# Patient Record
Sex: MALE | Race: WHITE | NOT HISPANIC OR LATINO | Employment: OTHER | URBAN - METROPOLITAN AREA
[De-identification: names, ages, dates, MRNs, and addresses within clinical notes are randomized per-mention and may not be internally consistent; named-entity substitution may affect disease eponyms.]

---

## 2017-07-13 ENCOUNTER — HISTORICAL (OUTPATIENT)
Dept: ADMINISTRATIVE | Facility: HOSPITAL | Age: 72
End: 2017-07-13

## 2017-08-11 ENCOUNTER — HISTORICAL (OUTPATIENT)
Dept: ADMINISTRATIVE | Facility: HOSPITAL | Age: 72
End: 2017-08-11

## 2017-08-22 ENCOUNTER — HISTORICAL (OUTPATIENT)
Dept: LAB | Facility: HOSPITAL | Age: 72
End: 2017-08-22

## 2017-08-22 LAB
ALBUMIN SERPL-MCNC: 3.9 GM/DL (ref 3.4–5)
ALP SERPL-CCNC: 97 UNIT/L (ref 46–116)
ALT SERPL-CCNC: 34 UNIT/L (ref 12–78)
AST SERPL-CCNC: 22 UNIT/L (ref 15–37)
BILIRUB SERPL-MCNC: 0.6 MG/DL (ref 0.2–1)
BILIRUBIN DIRECT+TOT PNL SERPL-MCNC: 0.17 MG/DL (ref 0–0.2)
BILIRUBIN DIRECT+TOT PNL SERPL-MCNC: 0.43 MG/DL (ref 0–0.8)
BUN SERPL-MCNC: 18 MG/DL (ref 7–18)
CALCIUM SERPL-MCNC: 9 MG/DL (ref 8.5–10.1)
CHLORIDE SERPL-SCNC: 105 MMOL/L (ref 98–107)
CHOLEST SERPL-MCNC: 111 MG/DL (ref 0–200)
CHOLEST/HDLC SERPL: 3.7 {RATIO} (ref 0–5)
CO2 SERPL-SCNC: 27.2 MMOL/L (ref 21–32)
CREAT SERPL-MCNC: 0.96 MG/DL (ref 0.6–1.3)
ERYTHROCYTE [DISTWIDTH] IN BLOOD BY AUTOMATED COUNT: 12.8 % (ref 11.5–17)
EST. AVERAGE GLUCOSE BLD GHB EST-MCNC: 114 MG/DL
FT4I SERPL CALC-MCNC: 2.38
GLUCOSE SERPL-MCNC: 107 MG/DL (ref 74–106)
HBA1C MFR BLD: 5.6 % (ref 4.5–6.2)
HCT VFR BLD AUTO: 46 % (ref 42–52)
HDLC SERPL-MCNC: 30 MG/DL (ref 40–60)
HGB BLD-MCNC: 15.2 GM/DL (ref 14–18)
LDLC SERPL CALC-MCNC: 52 MG/DL (ref 0–129)
MCH RBC QN AUTO: 31.9 PG (ref 27–31)
MCHC RBC AUTO-ENTMCNC: 33.2 GM/DL (ref 33–36)
MCV RBC AUTO: 96.2 FL (ref 80–94)
PLATELET # BLD AUTO: 151 X10(3)/MCL (ref 130–400)
PMV BLD AUTO: 8.6 FL (ref 7.4–10.4)
POTASSIUM SERPL-SCNC: 4 MMOL/L (ref 3.5–5.1)
PROT SERPL-MCNC: 7 GM/DL (ref 6.4–8.2)
PSA SERPL-MCNC: 0.71 NG/ML (ref 0–4)
RBC # BLD AUTO: 4.78 X10(6)/MCL (ref 4.7–6.1)
SODIUM SERPL-SCNC: 142 MMOL/L (ref 136–145)
T3RU NFR SERPL: 33 % (ref 31–39)
T4 SERPL-MCNC: 7.2 MCG/DL (ref 4.7–13.3)
TRIGL SERPL-MCNC: 146 MG/DL
TSH SERPL-ACNC: 2.34 MIU/ML (ref 0.36–3.74)
VLDLC SERPL CALC-MCNC: 29 MG/DL
WBC # SPEC AUTO: 7.1 X10(3)/MCL (ref 4.5–11.5)

## 2018-09-05 ENCOUNTER — HISTORICAL (OUTPATIENT)
Dept: LAB | Facility: HOSPITAL | Age: 73
End: 2018-09-05

## 2018-09-05 LAB
ABS NEUT (OLG): 3.78 X10(3)/MCL (ref 2.1–9.2)
ALBUMIN SERPL-MCNC: 3.9 GM/DL (ref 3.4–5)
ALP SERPL-CCNC: 84 UNIT/L (ref 46–116)
ALT SERPL-CCNC: 45 UNIT/L (ref 12–78)
AST SERPL-CCNC: 28 UNIT/L (ref 15–37)
BASOPHILS # BLD AUTO: 0 X10(3)/MCL (ref 0–0.2)
BASOPHILS NFR BLD AUTO: 0 %
BILIRUB SERPL-MCNC: 0.6 MG/DL (ref 0.2–1)
BILIRUBIN DIRECT+TOT PNL SERPL-MCNC: 0.18 MG/DL (ref 0–0.2)
BILIRUBIN DIRECT+TOT PNL SERPL-MCNC: 0.42 MG/DL (ref 0–0.8)
BUN SERPL-MCNC: 10 MG/DL (ref 7–18)
CALCIUM SERPL-MCNC: 8.8 MG/DL (ref 8.5–10.1)
CHLORIDE SERPL-SCNC: 106 MMOL/L (ref 98–107)
CHOLEST SERPL-MCNC: 90 MG/DL (ref 0–200)
CHOLEST/HDLC SERPL: 3 {RATIO} (ref 0–5)
CO2 SERPL-SCNC: 31.7 MMOL/L (ref 21–32)
CREAT SERPL-MCNC: 1.09 MG/DL (ref 0.6–1.3)
CREAT/UREA NIT SERPL: 9
EOSINOPHIL # BLD AUTO: 0.5 X10(3)/MCL (ref 0–0.9)
EOSINOPHIL NFR BLD AUTO: 7 %
ERYTHROCYTE [DISTWIDTH] IN BLOOD BY AUTOMATED COUNT: 11.8 % (ref 11.5–17)
FT4I SERPL CALC-MCNC: 1.98
GLUCOSE SERPL-MCNC: 108 MG/DL (ref 74–106)
HCT VFR BLD AUTO: 47.5 % (ref 42–52)
HDLC SERPL-MCNC: 30 MG/DL (ref 40–60)
HGB BLD-MCNC: 16 GM/DL (ref 14–18)
LDLC SERPL CALC-MCNC: 21 MG/DL (ref 0–129)
LYMPHOCYTES # BLD AUTO: 2.2 X10(3)/MCL (ref 0.6–4.6)
LYMPHOCYTES NFR BLD AUTO: 31 %
MCH RBC QN AUTO: 31.9 PG (ref 27–31)
MCHC RBC AUTO-ENTMCNC: 33.7 GM/DL (ref 33–36)
MCV RBC AUTO: 94.8 FL (ref 80–94)
MONOCYTES # BLD AUTO: 0.7 X10(3)/MCL (ref 0.1–1.3)
MONOCYTES NFR BLD AUTO: 9 %
NEUTROPHILS # BLD AUTO: 3.78 X10(3)/MCL (ref 1.4–7.9)
NEUTROPHILS NFR BLD AUTO: 52 %
PLATELET # BLD AUTO: 154 X10(3)/MCL (ref 130–400)
PMV BLD AUTO: 10.6 FL (ref 9.4–12.4)
POTASSIUM SERPL-SCNC: 5 MMOL/L (ref 3.5–5.1)
PROT SERPL-MCNC: 7 GM/DL (ref 6.4–8.2)
PSA SERPL-MCNC: 0.75 NG/ML (ref 0–4)
RBC # BLD AUTO: 5.01 X10(6)/MCL (ref 4.7–6.1)
SODIUM SERPL-SCNC: 143 MMOL/L (ref 136–145)
T3RU NFR SERPL: 30 % (ref 31–39)
T4 SERPL-MCNC: 6.6 MCG/DL (ref 4.7–13.3)
TRIGL SERPL-MCNC: 196 MG/DL
TSH SERPL-ACNC: 2.3 MIU/ML (ref 0.36–3.74)
VLDLC SERPL CALC-MCNC: 39 MG/DL
WBC # SPEC AUTO: 7.2 X10(3)/MCL (ref 4.5–11.5)

## 2019-04-19 ENCOUNTER — HISTORICAL (OUTPATIENT)
Dept: LAB | Facility: HOSPITAL | Age: 74
End: 2019-04-19

## 2019-04-19 LAB
ALBUMIN SERPL-MCNC: 4 GM/DL (ref 3.4–5)
ALP SERPL-CCNC: 99 UNIT/L (ref 46–116)
ALT SERPL-CCNC: 36 UNIT/L (ref 12–78)
AST SERPL-CCNC: 26 UNIT/L (ref 15–37)
BILIRUB SERPL-MCNC: 0.7 MG/DL (ref 0.2–1)
BILIRUBIN DIRECT+TOT PNL SERPL-MCNC: 0.19 MG/DL (ref 0–0.2)
BILIRUBIN DIRECT+TOT PNL SERPL-MCNC: 0.51 MG/DL (ref 0–0.8)
BUN SERPL-MCNC: 10.6 MG/DL (ref 7–18)
CALCIUM SERPL-MCNC: 9.2 MG/DL (ref 8.5–10.1)
CHLORIDE SERPL-SCNC: 104 MMOL/L (ref 98–107)
CHOLEST SERPL-MCNC: 96 MG/DL (ref 0–200)
CHOLEST/HDLC SERPL: 3.4 {RATIO} (ref 0–5)
CO2 SERPL-SCNC: 28.6 MMOL/L (ref 21–32)
CREAT SERPL-MCNC: 0.95 MG/DL (ref 0.6–1.3)
CREAT/UREA NIT SERPL: 11
ERYTHROCYTE [DISTWIDTH] IN BLOOD BY AUTOMATED COUNT: 12 % (ref 11.5–17)
FT4I SERPL CALC-MCNC: 1.91
GLUCOSE SERPL-MCNC: 119 MG/DL (ref 74–106)
HCT VFR BLD AUTO: 45.2 % (ref 42–52)
HDLC SERPL-MCNC: 28 MG/DL (ref 40–60)
HGB BLD-MCNC: 15.8 GM/DL (ref 14–18)
LDLC SERPL CALC-MCNC: 46 MG/DL (ref 0–129)
MCH RBC QN AUTO: 32.4 PG (ref 27–31)
MCHC RBC AUTO-ENTMCNC: 35 GM/DL (ref 33–36)
MCV RBC AUTO: 92.6 FL (ref 80–94)
PLATELET # BLD AUTO: 138 X10(3)/MCL (ref 130–400)
PMV BLD AUTO: 10.2 FL (ref 9.4–12.4)
POTASSIUM SERPL-SCNC: 4.1 MMOL/L (ref 3.5–5.1)
PROT SERPL-MCNC: 7.3 GM/DL (ref 6.4–8.2)
RBC # BLD AUTO: 4.88 X10(6)/MCL (ref 4.7–6.1)
SODIUM SERPL-SCNC: 141 MMOL/L (ref 136–145)
T3RU NFR SERPL: 29 % (ref 31–39)
T4 SERPL-MCNC: 6.6 MCG/DL (ref 4.7–13.3)
TRIGL SERPL-MCNC: 110 MG/DL
TSH SERPL-ACNC: 1.87 MIU/ML (ref 0.36–3.74)
VLDLC SERPL CALC-MCNC: 22 MG/DL
WBC # SPEC AUTO: 6.4 X10(3)/MCL (ref 4.5–11.5)

## 2019-04-24 LAB
EST. AVERAGE GLUCOSE BLD GHB EST-MCNC: 126 MG/DL
HBA1C MFR BLD: 6 % (ref 4.5–6.2)

## 2019-07-18 ENCOUNTER — HISTORICAL (OUTPATIENT)
Dept: RADIOLOGY | Facility: HOSPITAL | Age: 74
End: 2019-07-18

## 2019-10-02 ENCOUNTER — HISTORICAL (OUTPATIENT)
Dept: LAB | Facility: HOSPITAL | Age: 74
End: 2019-10-02

## 2019-10-02 LAB
ABS NEUT (OLG): 4.27 X10(3)/MCL (ref 2.1–9.2)
ALBUMIN SERPL-MCNC: 4 GM/DL (ref 3.4–5)
ALP SERPL-CCNC: 90 UNIT/L (ref 46–116)
ALT SERPL-CCNC: 40 UNIT/L (ref 12–78)
AST SERPL-CCNC: 30 UNIT/L (ref 15–37)
BASOPHILS # BLD AUTO: 0 X10(3)/MCL (ref 0–0.2)
BASOPHILS NFR BLD AUTO: 0 %
BILIRUB SERPL-MCNC: 0.7 MG/DL (ref 0.2–1)
BILIRUBIN DIRECT+TOT PNL SERPL-MCNC: 0.18 MG/DL (ref 0–0.2)
BILIRUBIN DIRECT+TOT PNL SERPL-MCNC: 0.52 MG/DL (ref 0–0.8)
BUN SERPL-MCNC: 17.7 MG/DL (ref 7–18)
CALCIUM SERPL-MCNC: 9.1 MG/DL (ref 8.5–10.1)
CHLORIDE SERPL-SCNC: 105 MMOL/L (ref 98–107)
CHOLEST SERPL-MCNC: 85 MG/DL (ref 0–200)
CHOLEST/HDLC SERPL: 2.9 {RATIO} (ref 0–5)
CO2 SERPL-SCNC: 29.6 MMOL/L (ref 21–32)
CREAT SERPL-MCNC: 0.9 MG/DL (ref 0.6–1.3)
CREAT/UREA NIT SERPL: 20
EOSINOPHIL # BLD AUTO: 0.5 X10(3)/MCL (ref 0–0.9)
EOSINOPHIL NFR BLD AUTO: 6 %
ERYTHROCYTE [DISTWIDTH] IN BLOOD BY AUTOMATED COUNT: 12.1 % (ref 11.5–17)
EST. AVERAGE GLUCOSE BLD GHB EST-MCNC: 117 MG/DL
FT4I SERPL CALC-MCNC: 2.18
GLUCOSE SERPL-MCNC: 110 MG/DL (ref 74–106)
HBA1C MFR BLD: 5.7 % (ref 4.5–6.2)
HCT VFR BLD AUTO: 47.8 % (ref 42–52)
HDLC SERPL-MCNC: 29 MG/DL (ref 40–60)
HGB BLD-MCNC: 15.5 GM/DL (ref 14–18)
IMM GRANULOCYTES # BLD AUTO: 0.01 % (ref 0–0.02)
IMM GRANULOCYTES NFR BLD AUTO: 0.1 % (ref 0–0.43)
LDLC SERPL CALC-MCNC: 41 MG/DL (ref 0–129)
LYMPHOCYTES # BLD AUTO: 2.3 X10(3)/MCL (ref 0.6–4.6)
LYMPHOCYTES NFR BLD AUTO: 30 %
MCH RBC QN AUTO: 31.8 PG (ref 27–31)
MCHC RBC AUTO-ENTMCNC: 32.4 GM/DL (ref 33–36)
MCV RBC AUTO: 98 FL (ref 80–94)
MONOCYTES # BLD AUTO: 0.6 X10(3)/MCL (ref 0.1–1.3)
MONOCYTES NFR BLD AUTO: 8 %
NEUTROPHILS # BLD AUTO: 4.27 X10(3)/MCL (ref 1.4–7.9)
NEUTROPHILS NFR BLD AUTO: 55 %
PLATELET # BLD AUTO: 128 X10(3)/MCL (ref 130–400)
PMV BLD AUTO: 10.5 FL (ref 9.4–12.4)
POTASSIUM SERPL-SCNC: 4.1 MMOL/L (ref 3.5–5.1)
PROT SERPL-MCNC: 7.2 GM/DL (ref 6.4–8.2)
PSA SERPL-MCNC: 2.1 NG/ML (ref 0–4)
RBC # BLD AUTO: 4.88 X10(6)/MCL (ref 4.7–6.1)
SODIUM SERPL-SCNC: 140 MMOL/L (ref 136–145)
T3RU NFR SERPL: 34 % (ref 31–39)
T4 SERPL-MCNC: 6.4 MCG/DL (ref 4.7–13.3)
TRIGL SERPL-MCNC: 77 MG/DL
TSH SERPL-ACNC: 2.76 MIU/ML (ref 0.36–3.74)
VLDLC SERPL CALC-MCNC: 15 MG/DL
WBC # SPEC AUTO: 7.7 X10(3)/MCL (ref 4.5–11.5)

## 2020-10-20 ENCOUNTER — HISTORICAL (OUTPATIENT)
Dept: LAB | Facility: HOSPITAL | Age: 75
End: 2020-10-20

## 2020-10-20 LAB
ABS NEUT (OLG): 3.91 X10(3)/MCL (ref 2.1–9.2)
ALBUMIN SERPL-MCNC: 4.08 GM/DL (ref 3.4–5)
ALP SERPL-CCNC: 89 UNIT/L (ref 46–116)
ALT SERPL-CCNC: 39 UNIT/L (ref 12–78)
AST SERPL-CCNC: 25 UNIT/L (ref 15–37)
BASOPHILS # BLD AUTO: 0 X10(3)/MCL (ref 0–0.2)
BASOPHILS NFR BLD AUTO: 0 %
BILIRUB SERPL-MCNC: 0.8 MG/DL (ref 0.2–1)
BILIRUBIN DIRECT+TOT PNL SERPL-MCNC: 0.22 MG/DL (ref 0–0.2)
BILIRUBIN DIRECT+TOT PNL SERPL-MCNC: 0.58 MG/DL (ref 0–0.8)
BUN SERPL-MCNC: 14.2 MG/DL (ref 7–18)
CALCIUM SERPL-MCNC: 9.1 MG/DL (ref 8.5–10.1)
CHLORIDE SERPL-SCNC: 104 MMOL/L (ref 98–107)
CHOLEST SERPL-MCNC: 104 MG/DL (ref 0–200)
CHOLEST/HDLC SERPL: 3.2 {RATIO} (ref 0–5)
CO2 SERPL-SCNC: 28 MMOL/L (ref 21–32)
CREAT SERPL-MCNC: 1.05 MG/DL (ref 0.6–1.3)
CREAT/UREA NIT SERPL: 14 MG/DL (ref 12–14)
EOSINOPHIL # BLD AUTO: 0.4 X10(3)/MCL (ref 0–0.9)
EOSINOPHIL NFR BLD AUTO: 7 %
ERYTHROCYTE [DISTWIDTH] IN BLOOD BY AUTOMATED COUNT: 12.1 % (ref 11.5–17)
EST. AVERAGE GLUCOSE BLD GHB EST-MCNC: 126 MG/DL
FT4I SERPL CALC-MCNC: 2.2
GLUCOSE SERPL-MCNC: 112 MG/DL (ref 74–106)
HBA1C MFR BLD: 6 % (ref 4.5–6.2)
HCT VFR BLD AUTO: 49.7 % (ref 42–52)
HDLC SERPL-MCNC: 33 MG/DL (ref 40–60)
HGB BLD-MCNC: 16.3 GM/DL (ref 14–18)
IMM GRANULOCYTES # BLD AUTO: 0.01 % (ref 0–0.02)
IMM GRANULOCYTES NFR BLD AUTO: 0.1 % (ref 0–0.43)
LDLC SERPL CALC-MCNC: 46 MG/DL (ref 0–129)
LYMPHOCYTES # BLD AUTO: 1.8 X10(3)/MCL (ref 0.6–4.6)
LYMPHOCYTES NFR BLD AUTO: 27 %
MCH RBC QN AUTO: 32 PG (ref 27–31)
MCHC RBC AUTO-ENTMCNC: 32.8 GM/DL (ref 33–36)
MCV RBC AUTO: 97.5 FL (ref 80–94)
MONOCYTES # BLD AUTO: 0.6 X10(3)/MCL (ref 0.1–1.3)
MONOCYTES NFR BLD AUTO: 9 %
NEUTROPHILS # BLD AUTO: 3.91 X10(3)/MCL (ref 1.4–7.9)
NEUTROPHILS NFR BLD AUTO: 57 %
PLATELET # BLD AUTO: 153 X10(3)/MCL (ref 130–400)
PMV BLD AUTO: 10.3 FL (ref 9.4–12.4)
POTASSIUM SERPL-SCNC: 4.1 MMOL/L (ref 3.5–5.1)
PROT SERPL-MCNC: 7.4 GM/DL (ref 6.4–8.2)
PSA SERPL-MCNC: 3.01 NG/ML (ref 0–4)
RBC # BLD AUTO: 5.1 X10(6)/MCL (ref 4.7–6.1)
SODIUM SERPL-SCNC: 141 MMOL/L (ref 136–145)
T3RU NFR SERPL: 31 % (ref 31–39)
T4 SERPL-MCNC: 7.1 MCG/DL (ref 4.7–13.3)
TRIGL SERPL-MCNC: 124 MG/DL
TSH SERPL-ACNC: 2.94 MIU/ML (ref 0.36–3.74)
VLDLC SERPL CALC-MCNC: 25 MG/DL
WBC # SPEC AUTO: 6.8 X10(3)/MCL (ref 4.5–11.5)

## 2022-04-21 ENCOUNTER — HISTORICAL (OUTPATIENT)
Dept: LAB | Facility: HOSPITAL | Age: 77
End: 2022-04-21

## 2022-04-21 LAB
HEMOLYSIS INTERF INDEX SERPL-ACNC: 8
POTASSIUM SERPL-SCNC: 4.1 MMOL/L (ref 3.5–5.1)

## 2022-04-30 NOTE — OP NOTE
Patient:   Toni Mehta            MRN: [id number]            FIN: [id number]               Age:   71 years     Sex:  Male     :  1945   Associated Diagnoses:   None   Author:   Pepe Phliippe MD      Preoperative Diagnosis: Cataract Right eye    Postoperative Diagnosis: Cataract Right eye    Procedure: Phacoemulsification with intaocular lens implantations Right eye    Surgeon: Pepe Philippe MD    Assistant: Ana Lilia Pleitez [facility]    Anestheisa: Topical    Complications: None    The patient was brought into the operating suite, where the patient was correctly identified as was the operative eye via timeout.  The patient was prepped and draped in a sterile ophthalmic fashion.  A lid speculum was placed in the operative eye and the microscope was brought into place.  A 1.0mm paracentesis was then made at (12) o'clock.  The anterior chamber was filled with Endocoat.  A (temporal) clear corneal incision was made with a 2.4 mm keratome.  A 6 mm corneal marking ring was used to felicita the cornea centered over the visual axis.  A 5.00 mm continuous curvilinear capsulorhexis was fashioned using a cystotome and microcapsular forceps.  Hydrodissection and hydrodelineation was performed with upreserved 1% Xylocaine.  The nucleus was then phacoemulsified with the Abbott phacoemulsification hand-piece with a total of (5) EFX.  The cortex was then removed with the Carlos I/A hand-piece. An BRANDT lens model (ZCB00) with a power of (22.5) was placed in the capsular bag.  The Helon was then removed from the eye with the Carlos I/A hand piece.  The anterior chamber was inflated and the wounds were hydrated with BSS.  The wounds were checked with Weck-Mayi sponges and found to be watertight.  The lid speculum was removed and topical antibiotics were placed on the operative eye.  The patient was brought to PACU in good condition.        Surgery Date 17 \[facility]\""

## 2022-09-19 ENCOUNTER — LAB VISIT (OUTPATIENT)
Dept: LAB | Facility: HOSPITAL | Age: 77
End: 2022-09-19
Attending: FAMILY MEDICINE
Payer: MEDICARE

## 2022-09-19 DIAGNOSIS — E78.5 HYPERLIPIDEMIA, UNSPECIFIED HYPERLIPIDEMIA TYPE: ICD-10-CM

## 2022-09-19 DIAGNOSIS — Z12.5 SPECIAL SCREENING FOR MALIGNANT NEOPLASM OF PROSTATE: ICD-10-CM

## 2022-09-19 DIAGNOSIS — Z00.00 ROUTINE GENERAL MEDICAL EXAMINATION AT A HEALTH CARE FACILITY: Primary | ICD-10-CM

## 2022-09-19 DIAGNOSIS — I10 ESSENTIAL HYPERTENSION, MALIGNANT: ICD-10-CM

## 2022-09-19 DIAGNOSIS — Z79.899 ENCOUNTER FOR LONG-TERM (CURRENT) USE OF OTHER MEDICATIONS: ICD-10-CM

## 2022-09-19 LAB
ALBUMIN SERPL-MCNC: 3.9 GM/DL (ref 3.4–4.8)
ALP SERPL-CCNC: 97 UNIT/L (ref 40–150)
ALT SERPL-CCNC: 22 UNIT/L (ref 0–55)
ANION GAP SERPL CALC-SCNC: 7 MEQ/L
AST SERPL-CCNC: 26 UNIT/L (ref 5–34)
BASOPHILS # BLD AUTO: 0.02 X10(3)/MCL (ref 0–0.2)
BASOPHILS NFR BLD AUTO: 0.3 %
BILIRUBIN DIRECT+TOT PNL SERPL-MCNC: 0.4 MG/DL (ref 0–0.5)
BILIRUBIN DIRECT+TOT PNL SERPL-MCNC: 0.4 MG/DL (ref 0–0.8)
BILIRUBIN DIRECT+TOT PNL SERPL-MCNC: 0.8 MG/DL
BUN SERPL-MCNC: 8.2 MG/DL (ref 8.4–25.7)
CALCIUM SERPL-MCNC: 9.5 MG/DL (ref 8.8–10)
CHLORIDE SERPL-SCNC: 107 MMOL/L (ref 98–107)
CHOLEST SERPL-MCNC: 91 MG/DL
CHOLEST/HDLC SERPL: 3 {RATIO} (ref 0–5)
CO2 SERPL-SCNC: 27 MMOL/L (ref 23–31)
CREAT SERPL-MCNC: 0.8 MG/DL (ref 0.73–1.18)
CREAT/UREA NIT SERPL: 10
EOSINOPHIL # BLD AUTO: 0.41 X10(3)/MCL (ref 0–0.9)
EOSINOPHIL NFR BLD AUTO: 5.7 %
ERYTHROCYTE [DISTWIDTH] IN BLOOD BY AUTOMATED COUNT: 12.1 % (ref 11.5–17)
FT4I SERPL CALC-MCNC: 2.51 (ref 2.6–3.6)
GFR SERPLBLD CREATININE-BSD FMLA CKD-EPI: >60 MLS/MIN/1.73/M2
GLUCOSE SERPL-MCNC: 114 MG/DL (ref 82–115)
HCT VFR BLD AUTO: 50.6 % (ref 42–52)
HDLC SERPL-MCNC: 27 MG/DL (ref 35–60)
HGB BLD-MCNC: 16.4 GM/DL (ref 14–18)
IMM GRANULOCYTES # BLD AUTO: 0.01 X10(3)/MCL (ref 0–0.04)
IMM GRANULOCYTES NFR BLD AUTO: 0.1 %
LDLC SERPL CALC-MCNC: 43 MG/DL (ref 50–140)
LYMPHOCYTES # BLD AUTO: 2.2 X10(3)/MCL (ref 0.6–4.6)
LYMPHOCYTES NFR BLD AUTO: 30.5 %
MCH RBC QN AUTO: 30.8 PG (ref 27–31)
MCHC RBC AUTO-ENTMCNC: 32.4 MG/DL (ref 33–36)
MCV RBC AUTO: 94.9 FL (ref 80–94)
MONOCYTES # BLD AUTO: 0.54 X10(3)/MCL (ref 0.1–1.3)
MONOCYTES NFR BLD AUTO: 7.5 %
NEUTROPHILS # BLD AUTO: 4 X10(3)/MCL (ref 2.1–9.2)
NEUTROPHILS NFR BLD AUTO: 55.9 %
PLATELET # BLD AUTO: 133 X10(3)/MCL (ref 130–400)
PMV BLD AUTO: 10.5 FL (ref 7.4–10.4)
POTASSIUM SERPL-SCNC: 4 MMOL/L (ref 3.5–5.1)
PROT SERPL-MCNC: 7.2 GM/DL (ref 5.8–7.6)
PSA SERPL-MCNC: 2.87 NG/ML
RBC # BLD AUTO: 5.33 X10(6)/MCL (ref 4.7–6.1)
SODIUM SERPL-SCNC: 141 MMOL/L (ref 136–145)
T3RU NFR SERPL: 40.16 % (ref 31–39)
T4 SERPL-MCNC: 6.24 UG/DL (ref 4.87–11.72)
TRIGL SERPL-MCNC: 105 MG/DL (ref 34–140)
TSH SERPL-ACNC: 2.39 UIU/ML (ref 0.35–4.94)
VLDLC SERPL CALC-MCNC: 21 MG/DL
WBC # SPEC AUTO: 7.2 X10(3)/MCL (ref 4.5–11.5)

## 2022-09-19 PROCEDURE — 84153 ASSAY OF PSA TOTAL: CPT

## 2022-09-19 PROCEDURE — 85025 COMPLETE CBC W/AUTO DIFF WBC: CPT

## 2022-09-19 PROCEDURE — 84443 ASSAY THYROID STIM HORMONE: CPT

## 2022-09-19 PROCEDURE — 36415 COLL VENOUS BLD VENIPUNCTURE: CPT

## 2022-09-19 PROCEDURE — 84479 ASSAY OF THYROID (T3 OR T4): CPT

## 2022-09-19 PROCEDURE — 80048 BASIC METABOLIC PNL TOTAL CA: CPT

## 2022-09-19 PROCEDURE — 84436 ASSAY OF TOTAL THYROXINE: CPT

## 2022-09-19 PROCEDURE — 80061 LIPID PANEL: CPT

## 2022-09-19 PROCEDURE — 80076 HEPATIC FUNCTION PANEL: CPT

## 2023-06-29 ENCOUNTER — HOSPITAL ENCOUNTER (OUTPATIENT)
Dept: RADIOLOGY | Facility: HOSPITAL | Age: 78
Discharge: HOME | End: 2023-06-29
Attending: FAMILY MEDICINE
Payer: MEDICARE

## 2023-06-29 DIAGNOSIS — M79.89 SWELLING OF RIGHT LOWER EXTREMITY: ICD-10-CM

## 2023-06-29 PROCEDURE — 93971 EXTREMITY STUDY: CPT | Mod: TC,RT

## 2023-10-19 ENCOUNTER — LAB VISIT (OUTPATIENT)
Dept: LAB | Facility: HOSPITAL | Age: 78
End: 2023-10-19
Attending: INTERNAL MEDICINE
Payer: MEDICARE

## 2023-10-19 DIAGNOSIS — I48.0 PAROXYSMAL ATRIAL FIBRILLATION: Primary | ICD-10-CM

## 2023-10-19 DIAGNOSIS — I10 ESSENTIAL HYPERTENSION, MALIGNANT: ICD-10-CM

## 2023-10-19 DIAGNOSIS — I25.10 CORONARY ATHEROSCLEROSIS OF NATIVE CORONARY ARTERY: ICD-10-CM

## 2023-10-19 DIAGNOSIS — E78.5 HYPERLIPIDEMIA, UNSPECIFIED HYPERLIPIDEMIA TYPE: ICD-10-CM

## 2023-10-19 LAB
ALBUMIN SERPL-MCNC: 3.9 G/DL (ref 3.4–4.8)
ALBUMIN/GLOB SERPL: 1.5 RATIO (ref 1.1–2)
ALP SERPL-CCNC: 108 UNIT/L (ref 40–150)
ALT SERPL-CCNC: 29 UNIT/L (ref 0–55)
AST SERPL-CCNC: 29 UNIT/L (ref 5–34)
BILIRUB SERPL-MCNC: 1 MG/DL
BUN SERPL-MCNC: 12.6 MG/DL (ref 8.4–25.7)
CALCIUM SERPL-MCNC: 9.3 MG/DL (ref 8.8–10)
CHLORIDE SERPL-SCNC: 107 MMOL/L (ref 98–107)
CHOLEST SERPL-MCNC: 83 MG/DL
CHOLEST/HDLC SERPL: 4 {RATIO} (ref 0–5)
CO2 SERPL-SCNC: 29 MMOL/L (ref 23–31)
CREAT SERPL-MCNC: 0.79 MG/DL (ref 0.73–1.18)
GFR SERPLBLD CREATININE-BSD FMLA CKD-EPI: >60 MLS/MIN/1.73/M2
GLOBULIN SER-MCNC: 2.6 GM/DL (ref 2.4–3.5)
GLUCOSE SERPL-MCNC: 105 MG/DL (ref 82–115)
HDLC SERPL-MCNC: 23 MG/DL (ref 35–60)
LDLC SERPL CALC-MCNC: 41 MG/DL (ref 50–140)
POTASSIUM SERPL-SCNC: 4.9 MMOL/L (ref 3.5–5.1)
PROT SERPL-MCNC: 6.5 GM/DL (ref 5.8–7.6)
SODIUM SERPL-SCNC: 143 MMOL/L (ref 136–145)
TRIGL SERPL-MCNC: 97 MG/DL (ref 34–140)
VLDLC SERPL CALC-MCNC: 19 MG/DL

## 2023-10-19 PROCEDURE — 36415 COLL VENOUS BLD VENIPUNCTURE: CPT

## 2023-10-19 PROCEDURE — 80061 LIPID PANEL: CPT

## 2023-10-19 PROCEDURE — 80053 COMPREHEN METABOLIC PANEL: CPT

## 2024-01-04 ENCOUNTER — LAB VISIT (OUTPATIENT)
Dept: LAB | Facility: HOSPITAL | Age: 79
End: 2024-01-04
Attending: FAMILY MEDICINE
Payer: MEDICARE

## 2024-01-04 DIAGNOSIS — Z12.5 SCREENING PSA (PROSTATE SPECIFIC ANTIGEN): Primary | ICD-10-CM

## 2024-01-04 DIAGNOSIS — I65.29 CAROTID ARTERY STENOSIS: ICD-10-CM

## 2024-01-04 DIAGNOSIS — I10 ESSENTIAL HYPERTENSION, MALIGNANT: ICD-10-CM

## 2024-01-04 DIAGNOSIS — E78.5 HYPERLIPIDEMIA, UNSPECIFIED HYPERLIPIDEMIA TYPE: ICD-10-CM

## 2024-01-04 DIAGNOSIS — F41.1 GENERALIZED ANXIETY DISORDER: ICD-10-CM

## 2024-01-04 DIAGNOSIS — I25.10 CORONARY ATHEROSCLEROSIS OF NATIVE CORONARY ARTERY: ICD-10-CM

## 2024-01-04 LAB
ALBUMIN SERPL-MCNC: 3.7 G/DL (ref 3.4–4.8)
ALP SERPL-CCNC: 101 UNIT/L (ref 40–150)
ALT SERPL-CCNC: 27 UNIT/L (ref 0–55)
ANION GAP SERPL CALC-SCNC: 8 MEQ/L
AST SERPL-CCNC: 29 UNIT/L (ref 5–34)
BASOPHILS # BLD AUTO: 0.02 X10(3)/MCL
BASOPHILS NFR BLD AUTO: 0.3 %
BILIRUB SERPL-MCNC: 1.2 MG/DL
BILIRUBIN DIRECT+TOT PNL SERPL-MCNC: 0.5 MG/DL (ref 0–?)
BILIRUBIN DIRECT+TOT PNL SERPL-MCNC: 0.7 MG/DL (ref 0–0.8)
BUN SERPL-MCNC: 11.4 MG/DL (ref 8.4–25.7)
CALCIUM SERPL-MCNC: 9.2 MG/DL (ref 8.8–10)
CHLORIDE SERPL-SCNC: 104 MMOL/L (ref 98–107)
CHOLEST SERPL-MCNC: 93 MG/DL
CHOLEST/HDLC SERPL: 4 {RATIO} (ref 0–5)
CO2 SERPL-SCNC: 27 MMOL/L (ref 23–31)
CREAT SERPL-MCNC: 0.77 MG/DL (ref 0.73–1.18)
CREAT/UREA NIT SERPL: 15
EOSINOPHIL # BLD AUTO: 0.4 X10(3)/MCL (ref 0–0.9)
EOSINOPHIL NFR BLD AUTO: 5.3 %
ERYTHROCYTE [DISTWIDTH] IN BLOOD BY AUTOMATED COUNT: 12.2 % (ref 11.5–17)
FT4I SERPL CALC-MCNC: 2.49 (ref 2.6–3.6)
GFR SERPLBLD CREATININE-BSD FMLA CKD-EPI: >60 MLS/MIN/1.73/M2
GLUCOSE SERPL-MCNC: 107 MG/DL (ref 82–115)
HCT VFR BLD AUTO: 45.8 % (ref 42–52)
HDLC SERPL-MCNC: 25 MG/DL (ref 35–60)
HGB BLD-MCNC: 15 G/DL (ref 14–18)
IMM GRANULOCYTES # BLD AUTO: 0.01 X10(3)/MCL (ref 0–0.04)
IMM GRANULOCYTES NFR BLD AUTO: 0.1 %
LDLC SERPL CALC-MCNC: 49 MG/DL (ref 50–140)
LYMPHOCYTES # BLD AUTO: 1.95 X10(3)/MCL (ref 0.6–4.6)
LYMPHOCYTES NFR BLD AUTO: 25.8 %
MCH RBC QN AUTO: 31.4 PG (ref 27–31)
MCHC RBC AUTO-ENTMCNC: 32.8 G/DL (ref 33–36)
MCV RBC AUTO: 96 FL (ref 80–94)
MONOCYTES # BLD AUTO: 0.6 X10(3)/MCL (ref 0.1–1.3)
MONOCYTES NFR BLD AUTO: 7.9 %
NEUTROPHILS # BLD AUTO: 4.57 X10(3)/MCL (ref 2.1–9.2)
NEUTROPHILS NFR BLD AUTO: 60.6 %
PLATELET # BLD AUTO: 123 X10(3)/MCL (ref 130–400)
PMV BLD AUTO: 10.8 FL (ref 7.4–10.4)
POTASSIUM SERPL-SCNC: 4.3 MMOL/L (ref 3.5–5.1)
PROT SERPL-MCNC: 6.9 GM/DL (ref 5.8–7.6)
PSA SERPL-MCNC: 3.25 NG/ML
RBC # BLD AUTO: 4.77 X10(6)/MCL (ref 4.7–6.1)
SODIUM SERPL-SCNC: 139 MMOL/L (ref 136–145)
T3RU NFR SERPL: 36.55 % (ref 31–39)
T4 SERPL-MCNC: 6.8 UG/DL (ref 4.87–11.72)
TRIGL SERPL-MCNC: 94 MG/DL (ref 34–140)
TSH SERPL-ACNC: 2.45 UIU/ML (ref 0.35–4.94)
VLDLC SERPL CALC-MCNC: 19 MG/DL
WBC # SPEC AUTO: 7.55 X10(3)/MCL (ref 4.5–11.5)

## 2024-01-04 PROCEDURE — 85025 COMPLETE CBC W/AUTO DIFF WBC: CPT

## 2024-01-04 PROCEDURE — 84443 ASSAY THYROID STIM HORMONE: CPT

## 2024-01-04 PROCEDURE — 80048 BASIC METABOLIC PNL TOTAL CA: CPT

## 2024-01-04 PROCEDURE — 80076 HEPATIC FUNCTION PANEL: CPT

## 2024-01-04 PROCEDURE — 36415 COLL VENOUS BLD VENIPUNCTURE: CPT

## 2024-01-04 PROCEDURE — 84436 ASSAY OF TOTAL THYROXINE: CPT

## 2024-01-04 PROCEDURE — 84153 ASSAY OF PSA TOTAL: CPT

## 2024-01-04 PROCEDURE — 80061 LIPID PANEL: CPT

## 2024-05-21 DIAGNOSIS — R60.0 LEG EDEMA: Primary | ICD-10-CM

## 2025-01-06 ENCOUNTER — HOSPITAL ENCOUNTER (OUTPATIENT)
Facility: HOSPITAL | Age: 80
Discharge: HOME | End: 2025-01-06
Attending: EMERGENCY MEDICINE | Admitting: FAMILY MEDICINE
Payer: MEDICARE

## 2025-01-06 VITALS
WEIGHT: 188.69 LBS | BODY MASS INDEX: 29.62 KG/M2 | HEART RATE: 60 BPM | DIASTOLIC BLOOD PRESSURE: 79 MMHG | HEIGHT: 67 IN | RESPIRATION RATE: 16 BRPM | TEMPERATURE: 98 F | OXYGEN SATURATION: 96 % | SYSTOLIC BLOOD PRESSURE: 168 MMHG

## 2025-01-06 DIAGNOSIS — R06.00 DYSPNEA, UNSPECIFIED TYPE: ICD-10-CM

## 2025-01-06 DIAGNOSIS — R00.2 PALPITATIONS: ICD-10-CM

## 2025-01-06 DIAGNOSIS — I48.91 ATRIAL FIBRILLATION, UNSPECIFIED TYPE: Primary | ICD-10-CM

## 2025-01-06 DIAGNOSIS — R06.02 SOB (SHORTNESS OF BREATH): ICD-10-CM

## 2025-01-06 PROBLEM — I10 PRIMARY HYPERTENSION: Status: ACTIVE | Noted: 2025-01-06

## 2025-01-06 PROBLEM — D69.6 THROMBOCYTOPENIA: Status: ACTIVE | Noted: 2025-01-06

## 2025-01-06 PROBLEM — E78.2 MIXED HYPERLIPIDEMIA: Status: ACTIVE | Noted: 2025-01-06

## 2025-01-06 LAB
ALBUMIN SERPL-MCNC: 4.1 G/DL (ref 3.4–4.8)
ALBUMIN/GLOB SERPL: 1 RATIO (ref 1.1–2)
ALP SERPL-CCNC: 123 UNIT/L (ref 40–150)
ALT SERPL-CCNC: 37 UNIT/L (ref 0–55)
ANION GAP SERPL CALC-SCNC: 7 MEQ/L
AST SERPL-CCNC: 33 UNIT/L (ref 5–34)
AV INDEX (PROSTH): 0.34
AV VALVE AREA: 1.1 CM²
BACTERIA #/AREA URNS AUTO: NORMAL /HPF
BASOPHILS # BLD AUTO: 0.02 X10(3)/MCL
BASOPHILS NFR BLD AUTO: 0.2 %
BILIRUB SERPL-MCNC: 0.6 MG/DL
BILIRUB UR QL STRIP.AUTO: NEGATIVE
BNP BLD-MCNC: 131 PG/ML
BSA FOR ECHO PROCEDURE: 2.01 M2
BUN SERPL-MCNC: 11.7 MG/DL (ref 8.4–25.7)
CALCIUM SERPL-MCNC: 9.6 MG/DL (ref 8.8–10)
CHLORIDE SERPL-SCNC: 108 MMOL/L (ref 98–107)
CLARITY UR: CLEAR
CO2 SERPL-SCNC: 29 MMOL/L (ref 23–31)
COLOR UR AUTO: NORMAL
CREAT SERPL-MCNC: 0.79 MG/DL (ref 0.72–1.25)
CREAT/UREA NIT SERPL: 15
CV ECHO LV RWT: 0.62 CM
DOP CALC AO VTI: 63.7 CM
DOP CALC LVOT AREA: 3.1 CM2
DOP CALC LVOT DIAMETER: 2 CM
DOP CALC LVOT STROKE VOLUME: 67.5 CM3
DOP CALCLVOT PEAK VEL VTI: 21.5 CM
ECHO LV POSTERIOR WALL: 1.2 CM (ref 0.6–1.1)
EJECTION FRACTION: 70 %
EOSINOPHIL # BLD AUTO: 0.45 X10(3)/MCL (ref 0–0.9)
EOSINOPHIL NFR BLD AUTO: 5.5 %
ERYTHROCYTE [DISTWIDTH] IN BLOOD BY AUTOMATED COUNT: 12.1 % (ref 11.5–17)
FRACTIONAL SHORTENING: 33.3 % (ref 28–44)
GFR SERPLBLD CREATININE-BSD FMLA CKD-EPI: >60 ML/MIN/1.73/M2
GLOBULIN SER-MCNC: 4 GM/DL (ref 2.4–3.5)
GLUCOSE SERPL-MCNC: 117 MG/DL (ref 82–115)
GLUCOSE UR QL STRIP: NEGATIVE
HCT VFR BLD AUTO: 50.8 % (ref 42–52)
HGB BLD-MCNC: 16.5 G/DL (ref 14–18)
HGB UR QL STRIP: NEGATIVE
IMM GRANULOCYTES # BLD AUTO: 0.01 X10(3)/MCL (ref 0–0.04)
IMM GRANULOCYTES NFR BLD AUTO: 0.1 %
INTERVENTRICULAR SEPTUM: 1.8 CM (ref 0.6–1.1)
KETONES UR QL STRIP: NEGATIVE
LEFT ATRIUM SIZE: 4.7 CM
LEFT INTERNAL DIMENSION IN SYSTOLE: 2.6 CM (ref 2.1–4)
LEFT VENTRICLE MASS INDEX: 114 G/M2
LEFT VENTRICULAR INTERNAL DIMENSION IN DIASTOLE: 3.9 CM (ref 3.5–6)
LEFT VENTRICULAR MASS: 224.6 G
LEUKOCYTE ESTERASE UR QL STRIP: NEGATIVE
LYMPHOCYTES # BLD AUTO: 2.17 X10(3)/MCL (ref 0.6–4.6)
LYMPHOCYTES NFR BLD AUTO: 26.6 %
MCH RBC QN AUTO: 31.5 PG (ref 27–31)
MCHC RBC AUTO-ENTMCNC: 32.5 G/DL (ref 33–36)
MCV RBC AUTO: 96.9 FL (ref 80–94)
MONOCYTES # BLD AUTO: 0.6 X10(3)/MCL (ref 0.1–1.3)
MONOCYTES NFR BLD AUTO: 7.4 %
NEUTROPHILS # BLD AUTO: 4.9 X10(3)/MCL (ref 2.1–9.2)
NEUTROPHILS NFR BLD AUTO: 60.2 %
NITRITE UR QL STRIP: NEGATIVE
OHS QRS DURATION: 118 MS
OHS QRS DURATION: 122 MS
OHS QTC CALCULATION: 461 MS
OHS QTC CALCULATION: 495 MS
PH UR STRIP: 7 [PH]
PLATELET # BLD AUTO: 119 X10(3)/MCL (ref 130–400)
PMV BLD AUTO: 11 FL (ref 7.4–10.4)
POTASSIUM SERPL-SCNC: 3.5 MMOL/L (ref 3.5–5.1)
PROT SERPL-MCNC: 8.1 GM/DL (ref 5.8–7.6)
PROT UR QL STRIP: NEGATIVE
RBC # BLD AUTO: 5.24 X10(6)/MCL (ref 4.7–6.1)
RBC #/AREA URNS AUTO: NORMAL /HPF
SODIUM SERPL-SCNC: 144 MMOL/L (ref 136–145)
SP GR UR STRIP.AUTO: 1.01 (ref 1–1.03)
SQUAMOUS #/AREA URNS AUTO: NORMAL /HPF
TROPONIN I SERPL-MCNC: 0.38 NG/ML (ref 0–0.04)
TROPONIN I SERPL-MCNC: <0.01 NG/ML (ref 0–0.04)
UROBILINOGEN UR STRIP-ACNC: 0.2
WBC # BLD AUTO: 8.15 X10(3)/MCL (ref 4.5–11.5)
WBC #/AREA URNS AUTO: NORMAL /HPF
Z-SCORE OF LEFT VENTRICULAR DIMENSION IN END DIASTOLE: -3.73
Z-SCORE OF LEFT VENTRICULAR DIMENSION IN END SYSTOLE: -2.3

## 2025-01-06 PROCEDURE — 83880 ASSAY OF NATRIURETIC PEPTIDE: CPT | Performed by: EMERGENCY MEDICINE

## 2025-01-06 PROCEDURE — 93005 ELECTROCARDIOGRAM TRACING: CPT

## 2025-01-06 PROCEDURE — 93010 ELECTROCARDIOGRAM REPORT: CPT | Mod: ,,, | Performed by: STUDENT IN AN ORGANIZED HEALTH CARE EDUCATION/TRAINING PROGRAM

## 2025-01-06 PROCEDURE — 36415 COLL VENOUS BLD VENIPUNCTURE: CPT | Performed by: FAMILY MEDICINE

## 2025-01-06 PROCEDURE — G0378 HOSPITAL OBSERVATION PER HR: HCPCS

## 2025-01-06 PROCEDURE — 84484 ASSAY OF TROPONIN QUANT: CPT | Performed by: EMERGENCY MEDICINE

## 2025-01-06 PROCEDURE — 25000003 PHARM REV CODE 250: Performed by: EMERGENCY MEDICINE

## 2025-01-06 PROCEDURE — 25000003 PHARM REV CODE 250: Performed by: FAMILY MEDICINE

## 2025-01-06 PROCEDURE — 81003 URINALYSIS AUTO W/O SCOPE: CPT | Performed by: EMERGENCY MEDICINE

## 2025-01-06 PROCEDURE — 84484 ASSAY OF TROPONIN QUANT: CPT | Mod: 91 | Performed by: FAMILY MEDICINE

## 2025-01-06 PROCEDURE — 99285 EMERGENCY DEPT VISIT HI MDM: CPT | Mod: 25

## 2025-01-06 PROCEDURE — 80053 COMPREHEN METABOLIC PANEL: CPT | Performed by: EMERGENCY MEDICINE

## 2025-01-06 PROCEDURE — 85025 COMPLETE CBC W/AUTO DIFF WBC: CPT | Performed by: EMERGENCY MEDICINE

## 2025-01-06 RX ORDER — LISINOPRIL 10 MG/1
10 TABLET ORAL DAILY
COMMUNITY
Start: 2024-11-14

## 2025-01-06 RX ORDER — GABAPENTIN 300 MG/1
600 CAPSULE ORAL 3 TIMES DAILY
Status: DISCONTINUED | OUTPATIENT
Start: 2025-01-06 | End: 2025-01-06 | Stop reason: HOSPADM

## 2025-01-06 RX ORDER — ATORVASTATIN CALCIUM 40 MG/1
40 TABLET, FILM COATED ORAL DAILY
Status: DISCONTINUED | OUTPATIENT
Start: 2025-01-06 | End: 2025-01-06 | Stop reason: HOSPADM

## 2025-01-06 RX ORDER — ATORVASTATIN CALCIUM 40 MG/1
40 TABLET, FILM COATED ORAL DAILY
COMMUNITY
Start: 2024-11-25

## 2025-01-06 RX ORDER — TAMSULOSIN HYDROCHLORIDE 0.4 MG/1
0.4 CAPSULE ORAL DAILY
Status: DISCONTINUED | OUTPATIENT
Start: 2025-01-06 | End: 2025-01-06 | Stop reason: HOSPADM

## 2025-01-06 RX ORDER — NAPROXEN SODIUM 220 MG/1
81 TABLET, FILM COATED ORAL ONCE
COMMUNITY

## 2025-01-06 RX ORDER — ATENOLOL 25 MG/1
25 TABLET ORAL 2 TIMES DAILY
Status: DISCONTINUED | OUTPATIENT
Start: 2025-01-06 | End: 2025-01-06 | Stop reason: HOSPADM

## 2025-01-06 RX ORDER — GABAPENTIN 600 MG/1
600 TABLET ORAL 3 TIMES DAILY
COMMUNITY

## 2025-01-06 RX ORDER — DICLOFENAC SODIUM 50 MG/1
50 TABLET, DELAYED RELEASE ORAL 2 TIMES DAILY
COMMUNITY

## 2025-01-06 RX ORDER — SODIUM CHLORIDE 0.9 % (FLUSH) 0.9 %
10 SYRINGE (ML) INJECTION
Status: DISCONTINUED | OUTPATIENT
Start: 2025-01-06 | End: 2025-01-06 | Stop reason: HOSPADM

## 2025-01-06 RX ORDER — NAPROXEN SODIUM 220 MG/1
81 TABLET, FILM COATED ORAL ONCE
Status: COMPLETED | OUTPATIENT
Start: 2025-01-06 | End: 2025-01-06

## 2025-01-06 RX ORDER — ATENOLOL 25 MG/1
25 TABLET ORAL 2 TIMES DAILY
COMMUNITY
Start: 2024-11-25

## 2025-01-06 RX ORDER — TAMSULOSIN HYDROCHLORIDE 0.4 MG/1
0.4 CAPSULE ORAL DAILY
COMMUNITY

## 2025-01-06 RX ORDER — LISINOPRIL 10 MG/1
10 TABLET ORAL DAILY
Status: DISCONTINUED | OUTPATIENT
Start: 2025-01-06 | End: 2025-01-06 | Stop reason: HOSPADM

## 2025-01-06 RX ORDER — TALC
6 POWDER (GRAM) TOPICAL NIGHTLY PRN
Status: DISCONTINUED | OUTPATIENT
Start: 2025-01-06 | End: 2025-01-06 | Stop reason: HOSPADM

## 2025-01-06 RX ADMIN — GABAPENTIN 600 MG: 300 CAPSULE ORAL at 02:01

## 2025-01-06 RX ADMIN — APIXABAN 5 MG: 5 TABLET, FILM COATED ORAL at 05:01

## 2025-01-06 RX ADMIN — APIXABAN 5 MG: 5 TABLET, FILM COATED ORAL at 04:01

## 2025-01-06 RX ADMIN — ATORVASTATIN CALCIUM 40 MG: 40 TABLET, FILM COATED ORAL at 02:01

## 2025-01-06 RX ADMIN — LISINOPRIL 10 MG: 10 TABLET ORAL at 02:01

## 2025-01-06 RX ADMIN — TAMSULOSIN HYDROCHLORIDE 0.4 MG: 0.4 CAPSULE ORAL at 02:01

## 2025-01-06 RX ADMIN — ASPIRIN 81 MG 81 MG: 81 TABLET ORAL at 02:01

## 2025-01-06 NOTE — ED NOTES
Patricia Bass coming for lab appointment on 6-1-2018  Please enter orders or advise otherwise.       Pt accepted per Dr Garrison. Report to Raina CHASE. Pt to rm 114 per WC

## 2025-01-06 NOTE — HPI
75-year-old male, known to Dr. Dejesus, PCP Dr. Bhatti with PMH of  PAF, nonobstructive CAD, HTN, HLD, who presented to the ER with palpitations for the last 2-3 days. He reported taking an extra dose of atenolol without improvement. He denied CP, SOB, N/V, or diaphoresis. EKG on arrival revealed AF RVR which eventually improved with CVR without intervention (he did take the extra dose of atenolol just prior to coming to ER).  Initial trop was negative. At time of assessment he remained in AF controlled rate and denied complaints. PT reports he had some left elbow and left jaw pain during episode of fast HR yesterday, denies any ongoing or prescence of chest pain. No nausea or vomiting, currently is chest pain free. Pt adamant he does not want to stay in the hospital another night.  Says he feels better and wants to go home.

## 2025-01-06 NOTE — PROGRESS NOTES
[facility]    Cardiology  Progress Note    Patient Name: Toni Mehta  MRN: [id number]  Admission Date: 1/6/2025  Hospital Length of Stay: 0 days  Code Status: Full Code   Attending Physician: Solomon Garrison MD   Primary Care Physician: Bk Germain Jr., MD  Expected Discharge Date:   Principal Problem:<principal problem not specified>    Subjective:     Brief HPI/Hospital Course:  75-year-old male, known to Dr. Dejesus, with PMH of  PAF, nonobstructive CAD, HTN, HLD, who presented to the ER with palpitations for the last 2-3 days. He reported taking an extra dose of atenolol without improvement. He denied CP, SOB, N/V, or diaphoresis. EKG on arrival revealed AF RVR which eventually improved with CVR without intervention (he did take the extra dose of atenolol just prior to coming to ER).  Initial trop was negative. At time of assessment he remained in AF CVR and denied complaints.      PMH: PAF, CAD, HTN, HLD  PSH: [facility]  Family History:   Social History:      Previous Cardiac Diagnostics:      [facility] 6/21  Left main coronary artery is nonobstructive and bifurcates to the left   anterior descending artery and left circumflex artery   Left anterior descending artery is a moderate caliber vessel which reaches   the apex of the heart and is nonobstructive   Left circumflex artery is a moderate caliber vessel which supplies a   moderate area of myocardium and is nonobstructive   The right coronary artery is a moderate caliber dominant vessel which   supplies a moderate area of myocardium and is nonobstructive      Echo 10/25/23  Global left ventricular systolic function is normal. The left ventricular ejection fraction is 55%. Left ventricular diastolic function is normal. Noted left ventricular hypertrophy. It is mild.   Mild aortic valve stenosis is present. The trans-aortic peak velocity is 2.2 m/s. The trans-aortic mean gradient is 9.3 mmHg. DI: 0.36  Mild (1+) mitral regurgitation. Trace  "tricuspid regurgitation.   The pulmonary artery systolic pressure is 12 mmHg.         Review of Systems   All other systems reviewed and are negative.    Objective:     Vital Signs (Most Recent):  Temp: 97.4 °F (36.3 °C) (01/06/25 0842)  Pulse: 65 (01/06/25 0842)  Resp: 16 (01/06/25 0453)  BP: (!) 159/71 (01/06/25 0842)  SpO2: 96 % (01/06/25 0842) Vital Signs (24h Range):  Temp:  [97.4 °F (36.3 °C)-97.9 °F (36.6 °C)] 97.4 °F (36.3 °C)  Pulse:  [] 65  Resp:  [15-31] 16  SpO2:  [92 %-99 %] 96 %  BP: (100-192)/() 159/71   Weight: 85.6 kg (188 lb 11.4 oz)  Body mass index is 29.56 kg/m².  SpO2: 96 %       Intake/Output Summary (Last 24 hours) at 1/6/2025 1042  Last data filed at 1/6/2025 0912  Gross per 24 hour   Intake 360 ml   Output --   Net 360 ml     Lines/Drains/Airways       Peripheral Intravenous Line  Duration                  Peripheral IV - Single Lumen 01/06/25 0137 20 G Right Antecubital <1 day                    Significant Labs:   Chemistries:   Recent Labs   Lab 01/06/25  0152      K 3.5   *   CO2 29   BUN 11.7   CREATININE 0.79   CALCIUM 9.6   BILITOT 0.6   ALKPHOS 123   ALT 37   AST 33   GLUCOSE 117*   TROPONINI <0.010        CBC/Anemia Labs: Coags:    Recent Labs   Lab 01/06/25  0152   WBC 8.15   HGB 16.5   HCT 50.8   *   MCV 96.9*   RDW 12.1    No results for input(s): "PT", "INR", "APTT" in the last 168 hours.     Telemetry:  SR 63 bpm    Physical Exam  Vitals reviewed.   Constitutional:       Appearance: Normal appearance.   HENT:      Head: Normocephalic and atraumatic.   Eyes:      Conjunctiva/sclera: Conjunctivae normal.      Pupils: Pupils are equal, round, and reactive to light.   Cardiovascular:      Rate and Rhythm: Normal rate and regular rhythm.   Pulmonary:      Effort: Pulmonary effort is normal.      Breath sounds: Normal breath sounds.   Musculoskeletal:         General: Normal range of motion.      Right lower leg: No edema.      Left lower leg: No " edema.   Skin:     General: Skin is warm and dry.   Neurological:      General: No focal deficit present.      Mental Status: He is alert and oriented to person, place, and time.         Current Schedule Inpatient Medications:   apixaban  5 mg Oral BID     Continuous Infusions:      Assessment:   PAF - AF RVR on arrival        No AF on EKGs or symptoms since 2021      [id number]: at least 3  HTN  Nonobstrucive CAD  HLD      Plan:   Echo pending.    Resume home atenolol 25 mg BID, atorvastatin 40 mg daily, lisinopril 10 mg daily.  Continue Eliquis 5 mg po BID  Schedule 14 day MCT with [facility] Ont[facility]  Follow-up with RENE Ontiveros, Dr Dejesus for results of MCT and hospital follow-up.  Patient meets ASPEN criteria for   malnutrition of   per RD assessment as evidenced by:                       A minimum of two characteristics is recommended for diagnosis of either severe or non-severe malnutrition.    BRAYDON Chew  Cardiology  [facility]

## 2025-01-06 NOTE — H&P
[facility] Medicine  History & Physical    Patient Name: Toni Mehta  MRN: [id number]  Patient Class: OP- Observation  Admission Date: 1/6/2025  Attending Physician: Lisa Payne MD  Primary Care Provider: Bk Germain Jr., MD         Patient information was obtained from patient and ER records.     Subjective:     Principal Problem:Atrial fibrillation with rapid ventricular response    Chief Complaint:   Chief Complaint   Patient presents with    Palpitations     L arm pain, neck pain, palpation on and off all day. Started again around midnight. SBP at home was >200.        HPI:   75-year-old male, known to Dr. Dejesus, PCP Dr. Bhatti with PMH of  PAF, nonobstructive CAD, HTN, HLD, who presented to the ER with palpitations for the last 2-3 days. He reported taking an extra dose of atenolol without improvement. He denied CP, SOB, N/V, or diaphoresis. EKG on arrival revealed AF RVR which eventually improved with CVR without intervention (he did take the extra dose of atenolol just prior to coming to ER).  Initial trop was negative. At time of assessment he remained in AF controlled rate and denied complaints. PT reports he had some left elbow and left jaw pain during episode of fast HR yesterday, denies any ongoing or prescence of chest pain. No nausea or vomiting, currently is chest pain free. Pt adamant he does not want to stay in the hospital another night.  Says he feels better and wants to go home.    No past medical history on file.    No past surgical history on file.    Review of patient's allergies indicates:  No Known Allergies    No current facility-administered medications on file prior to encounter.     Current Outpatient Medications on File Prior to Encounter   Medication Sig    aspirin 81 MG Chew Take 81 mg by mouth once.    atenoloL (TENORMIN) 25 MG tablet Take 25 mg by mouth 2 (two) times daily.    atorvastatin (LIPITOR) 40 MG tablet Take 40  mg by mouth once daily.    gabapentin (NEURONTIN) 600 MG tablet Take 600 mg by mouth 3 (three) times daily.    lisinopriL 10 MG tablet Take 10 mg by mouth once daily.    tamsulosin (FLOMAX) 0.4 mg Cap Take 0.4 mg by mouth once daily.    diclofenac (VOLTAREN) 50 MG EC tablet Take 50 mg by mouth 2 (two) times daily.     Family History    None       Tobacco Use    Smoking status: Not on file    Smokeless tobacco: Not on file   Substance and Sexual Activity    Alcohol use: Not on file    Drug use: Not on file    Sexual activity: Not on file     Review of Systems   Constitutional:  Negative for appetite change, fatigue and fever.   Respiratory:  Negative for cough, chest tightness, shortness of breath and wheezing.         No chest pain, does admit to left arm pain and left jaw pain during epiosed of palpitiations, none now   Cardiovascular:  Positive for palpitations. Negative for chest pain and leg swelling.   Gastrointestinal:  Negative for abdominal distention, abdominal pain, constipation, diarrhea, nausea and vomiting.   Skin:  Negative for color change, pallor, rash and wound.   Neurological:  Negative for tremors, syncope and headaches.   Psychiatric/Behavioral:  Negative for agitation and behavioral problems.      Objective:     Vital Signs (Most Recent):  Temp: 97.5 °F (36.4 °C) (01/06/25 1215)  Pulse: 64 (01/06/25 1215)  Resp: 16 (01/06/25 0453)  BP: (!) 144/72 (01/06/25 1215)  SpO2: 96 % (01/06/25 1215) Vital Signs (24h Range):  Temp:  [97.4 °F (36.3 °C)-97.9 °F (36.6 °C)] 97.5 °F (36.4 °C)  Pulse:  [] 64  Resp:  [15-31] 16  SpO2:  [92 %-99 %] 96 %  BP: (100-192)/() 144/72     Weight: 85.6 kg (188 lb 11.4 oz)  Body mass index is 29.56 kg/m².     Physical Exam  Vitals and nursing note reviewed. Exam conducted with a chaperone present.   Constitutional:       General: He is not in acute distress.     Appearance: Normal appearance. He is normal weight. He is not ill-appearing or diaphoretic.   HENT:       Head: Normocephalic and atraumatic.      Nose: Nose normal.   Eyes:      General: No scleral icterus.     Conjunctiva/sclera: Conjunctivae normal.   Cardiovascular:      Rate and Rhythm: Normal rate. Rhythm irregular.      Pulses: Normal pulses.      Heart sounds: Normal heart sounds.      Comments: Afib rate controlled 60-70  Pulmonary:      Effort: Pulmonary effort is normal.      Breath sounds: Normal breath sounds.   Abdominal:      Palpations: Abdomen is soft.   Musculoskeletal:      Right lower leg: No edema.      Left lower leg: No edema.   Skin:     General: Skin is warm and dry.      Findings: No erythema or rash.   Neurological:      General: No focal deficit present.      Mental Status: He is alert and oriented to person, place, and time.   Psychiatric:         Mood and Affect: Mood normal.         Behavior: Behavior normal.         Thought Content: Thought content normal.                Significant Labs: All pertinent labs within the past 24 hours have been reviewed.  CBC:   Recent Labs   Lab 01/06/25  0152   WBC 8.15   HGB 16.5   HCT 50.8   *     CMP:   Recent Labs   Lab 01/06/25  0152      K 3.5   *   CO2 29   BUN 11.7   CREATININE 0.79   CALCIUM 9.6   ALBUMIN 4.1   BILITOT 0.6   ALKPHOS 123   AST 33   ALT 37       Significant Imaging: I have reviewed all pertinent imaging results/findings within the past 24 hours.  Assessment/Plan:     * Atrial fibrillation with rapid ventricular response  Patient has paroxysmal (<7 days) atrial fibrillation. Patient is currently in atrial fibrillation. [id number] Score: 2. The patients heart rate in the last 24 hours is as follows:  Pulse  Min: 55  Max: 113     Antiarrhythmics  , 2 times daily, Oral    Anticoagulants  apixaban tablet 5 mg, 2 times daily, Oral    Plan  - Replete lytes with a goal of K>4, Mg >2  - Patient is anticoagulated, see medications listed above.  - Patient's afib is currently controlled  -  troponin and BNP not  "elevated  Recheck troponin now  Needs repeat echo if unable to have done today may see if could be done at [facility]  Also needs outpt Schedule 14 day MCT with [facility] Ont[facility]  Follow-up with [facility], Dr Dejesus for results of MCT and hospital follow-up        Mixed hyperlipidemia   Patient is chronically on statin.will continue for now. Monitor clinically. Last LDL was No results found for: "LDLCALC"         Primary hypertension  Patient's blood pressure range in the last 24 hours was: BP  Min: 100/61  Max: 192/154.The patient's inpatient anti-hypertensive regimen is listed below:  Current Antihypertensives  , 2 times daily, Oral  , Daily, Oral  atenoloL tablet 25 mg, 2 times daily, Oral  lisinopriL tablet 10 mg, Daily, Oral    Plan  - BP is controlled, no changes needed to their regimen  -  Bp more on higher side would likely tolerate increased atenolol if needed    Thrombocytopenia  The likely etiology of thrombocytopenia is  unknown etiology will need to f/u with PCP and cardiology laila since started doAC . The patients 3 most recent labs are listed below.  Recent Labs     01/06/25  0152   *     Plan  - Will transfuse if platelet count is <50k (if undergoing surgical procedure or have active bleeding).  -          VTE Risk Mitigation (From admission, onward)           Ordered     apixaban tablet 5 mg  2 times daily         01/06/25 0352                       On 01/06/2025, patient should be placed in hospital observation services under my care.     Patient Screened for food insecurity, housing instability, transportation needs, utility difficulties, and interpersonal safety.  No needs identified      Lisa Payne MD  Department of Hospital Medicine  [facility]          "

## 2025-01-06 NOTE — SUBJECTIVE & OBJECTIVE
No past medical history on file.    No past surgical history on file.    Review of patient's allergies indicates:  No Known Allergies    No current facility-administered medications on file prior to encounter.     Current Outpatient Medications on File Prior to Encounter   Medication Sig    aspirin 81 MG Chew Take 81 mg by mouth once.    atenoloL (TENORMIN) 25 MG tablet Take 25 mg by mouth 2 (two) times daily.    atorvastatin (LIPITOR) 40 MG tablet Take 40 mg by mouth once daily.    gabapentin (NEURONTIN) 600 MG tablet Take 600 mg by mouth 3 (three) times daily.    lisinopriL 10 MG tablet Take 10 mg by mouth once daily.    tamsulosin (FLOMAX) 0.4 mg Cap Take 0.4 mg by mouth once daily.    diclofenac (VOLTAREN) 50 MG EC tablet Take 50 mg by mouth 2 (two) times daily.     Family History    None       Tobacco Use    Smoking status: Not on file    Smokeless tobacco: Not on file   Substance and Sexual Activity    Alcohol use: Not on file    Drug use: Not on file    Sexual activity: Not on file     Review of Systems   Constitutional:  Negative for appetite change, fatigue and fever.   Respiratory:  Negative for cough, chest tightness, shortness of breath and wheezing.         No chest pain, does admit to left arm pain and left jaw pain during epiosed of palpitiations, none now   Cardiovascular:  Positive for palpitations. Negative for chest pain and leg swelling.   Gastrointestinal:  Negative for abdominal distention, abdominal pain, constipation, diarrhea, nausea and vomiting.   Skin:  Negative for color change, pallor, rash and wound.   Neurological:  Negative for tremors, syncope and headaches.   Psychiatric/Behavioral:  Negative for agitation and behavioral problems.      Objective:     Vital Signs (Most Recent):  Temp: 97.5 °F (36.4 °C) (01/06/25 1215)  Pulse: 64 (01/06/25 1215)  Resp: 16 (01/06/25 0453)  BP: (!) 144/72 (01/06/25 1215)  SpO2: 96 % (01/06/25 1215) Vital Signs (24h Range):  Temp:  [97.4 °F (36.3  °C)-97.9 °F (36.6 °C)] 97.5 °F (36.4 °C)  Pulse:  [] 64  Resp:  [15-31] 16  SpO2:  [92 %-99 %] 96 %  BP: (100-192)/() 144/72     Weight: 85.6 kg (188 lb 11.4 oz)  Body mass index is 29.56 kg/m².     Physical Exam  Vitals and nursing note reviewed. Exam conducted with a chaperone present.   Constitutional:       General: He is not in acute distress.     Appearance: Normal appearance. He is normal weight. He is not ill-appearing or diaphoretic.   HENT:      Head: Normocephalic and atraumatic.      Nose: Nose normal.   Eyes:      General: No scleral icterus.     Conjunctiva/sclera: Conjunctivae normal.   Cardiovascular:      Rate and Rhythm: Normal rate. Rhythm irregular.      Pulses: Normal pulses.      Heart sounds: Normal heart sounds.      Comments: Afib rate controlled 60-70  Pulmonary:      Effort: Pulmonary effort is normal.      Breath sounds: Normal breath sounds.   Abdominal:      Palpations: Abdomen is soft.   Musculoskeletal:      Right lower leg: No edema.      Left lower leg: No edema.   Skin:     General: Skin is warm and dry.      Findings: No erythema or rash.   Neurological:      General: No focal deficit present.      Mental Status: He is alert and oriented to person, place, and time.   Psychiatric:         Mood and Affect: Mood normal.         Behavior: Behavior normal.         Thought Content: Thought content normal.                Significant Labs: All pertinent labs within the past 24 hours have been reviewed.  CBC:   Recent Labs   Lab 01/06/25  0152   WBC 8.15   HGB 16.5   HCT 50.8   *     CMP:   Recent Labs   Lab 01/06/25  0152      K 3.5   *   CO2 29   BUN 11.7   CREATININE 0.79   CALCIUM 9.6   ALBUMIN 4.1   BILITOT 0.6   ALKPHOS 123   AST 33   ALT 37       Significant Imaging: I have reviewed all pertinent imaging results/findings within the past 24 hours.

## 2025-01-06 NOTE — Clinical Note
Diagnosis: Atrial fibrillation, unspecified type [[id number]]   Future Attending Provider: JOSHUA ASH [[id number]]   Special Needs:: No Special Needs [1]

## 2025-01-06 NOTE — NURSING
Call placed to Dr. Payne regarding pt wanting to be sent home after Echo was done. Informed her of preliminary echo results. Stated pt can be d/c home and will put orders in.

## 2025-01-06 NOTE — CONSULTS
[facility]    Cardiology  Consult Note    Patient Name: Toni Mehta  MRN: [id number]  Admission Date: 1/6/2025  Hospital Length of Stay: 0 days  Code Status: No Order   Attending Provider: Raciel Ramirez MD   Consulting Provider: Hira Alaniz NP  Primary Care Physician: Bk Germain Jr., MD  Principal Problem:<principal problem not specified>    Patient information was obtained from patient and ER records.     Subjective:     Chief Complaint/Reason for Consult: Palpitations    HPI: 75-year-old male, known to Dr. Dejesus, with PMH of  PAF, nonobstructive CAD, HTN, HLD, who presented to the ER with palpitations for the last 2-3 days. He reported taking an extra dose of atenolol without improvement. He denied CP, SOB, N/V, or diaphoresis. EKG on arrival revealed AF RVR which eventually improved with CVR without intervention (he did take the extra dose of atenolol just prior to coming to ER).  Initial trop was negative. At time of assessment in remained in AF CVR and denied complaints.     PMH: PAF, CAD, HTN, HLD  PSH: [facility]  Family History:   Social History:     Previous Cardiac Diagnostics:     [facility] 6/21  Left main coronary artery is nonobstructive and bifurcates to the left   anterior descending artery and left circumflex artery   Left anterior descending artery is a moderate caliber vessel which reaches   the apex of the heart and is nonobstructive   Left circumflex artery is a moderate caliber vessel which supplies a   moderate area of myocardium and is nonobstructive   The right coronary artery is a moderate caliber dominant vessel which   supplies a moderate area of myocardium and is nonobstructive     Echo 10/25/23  Global left ventricular systolic function is normal. The left ventricular ejection fraction is 55%. Left ventricular diastolic function is normal. Noted left ventricular hypertrophy. It is mild.   Mild aortic valve stenosis is present. The trans-aortic peak velocity is  2.2 m/s. The trans-aortic mean gradient is 9.3 mmHg. DI: 0.36  Mild (1+) mitral regurgitation. Trace tricuspid regurgitation.   The pulmonary artery systolic pressure is 12 mmHg.       No past medical history on file.  No past surgical history on file.  Review of patient's allergies indicates:  No Known Allergies  No current facility-administered medications on file prior to encounter.     Current Outpatient Medications on File Prior to Encounter   Medication Sig    atenoloL (TENORMIN) 25 MG tablet Take 25 mg by mouth 2 (two) times daily.    atorvastatin (LIPITOR) 40 MG tablet Take 40 mg by mouth once daily.    lisinopriL 10 MG tablet Take 10 mg by mouth once daily.    aspirin 81 MG Chew Take 81 mg by mouth once.    diclofenac (VOLTAREN) 50 MG EC tablet Take 50 mg by mouth 2 (two) times daily.    gabapentin (NEURONTIN) 600 MG tablet Take 600 mg by mouth 3 (three) times daily.     Family History    None       Tobacco Use    Smoking status: Not on file    Smokeless tobacco: Not on file   Substance and Sexual Activity    Alcohol use: Not on file    Drug use: Not on file    Sexual activity: Not on file       Review of Systems   Constitutional:  Positive for fatigue.   HENT: Negative.     Eyes: Negative.    Respiratory: Negative.     Cardiovascular:  Positive for palpitations. Negative for chest pain and leg swelling.   Endocrine: Negative.    Genitourinary: Negative.      Objective:     Vital Signs (Most Recent):  Temp: 97.9 °F (36.6 °C) (01/06/25 0129)  Pulse: 63 (01/06/25 0332)  Resp: 16 (01/06/25 0332)  BP: 123/75 (01/06/25 0332)  SpO2: (!) 94 % (01/06/25 0332) Vital Signs (24h Range):  Temp:  [97.9 °F (36.6 °C)] 97.9 °F (36.6 °C)  Pulse:  [] 63  Resp:  [15-31] 16  SpO2:  [92 %-99 %] 94 %  BP: (100-192)/() 123/75   Weight: 86.2 kg (190 lb)  Body mass index is 29.76 kg/m².  SpO2: (!) 94 %     No intake or output data in the 24 hours ending 01/06/25 0358  Lines/Drains/Airways       Peripheral Intravenous  "Line  Duration                  Peripheral IV - Single Lumen 01/06/25 0137 20 G Right Antecubital <1 day                  Significant Labs:   Chemistries:   Recent Labs   Lab 01/06/25  0152      K 3.5   *   CO2 29   BUN 11.7   CREATININE 0.79   CALCIUM 9.6   BILITOT 0.6   ALKPHOS 123   ALT 37   AST 33   GLUCOSE 117*   TROPONINI <0.010        CBC/Anemia Labs: Coags:    Recent Labs   Lab 01/06/25  0152   WBC 8.15   HGB 16.5   HCT 50.8   *   MCV 96.9*   RDW 12.1    No results for input(s): "PT", "INR", "APTT" in the last 168 hours.     Significant Imaging:  Imaging Results              X-Ray Chest 1 View (In process)                   EKG:     Telemetry:  AF CVR    Physical Exam  Constitutional:       Appearance: Normal appearance.   HENT:      Head: Normocephalic.   Eyes:      Extraocular Movements: Extraocular movements intact.   Cardiovascular:      Rate and Rhythm: Normal rate. Rhythm irregular.   Pulmonary:      Effort: Pulmonary effort is normal.      Breath sounds: Normal breath sounds.   Neurological:      General: No focal deficit present.      Mental Status: He is alert and oriented to person, place, and time.   Psychiatric:         Mood and Affect: Mood normal.         Behavior: Behavior normal.       Home Medications:   No current facility-administered medications on file prior to encounter.     Current Outpatient Medications on File Prior to Encounter   Medication Sig Dispense Refill    atenoloL (TENORMIN) 25 MG tablet Take 25 mg by mouth 2 (two) times daily.      atorvastatin (LIPITOR) 40 MG tablet Take 40 mg by mouth once daily.      lisinopriL 10 MG tablet Take 10 mg by mouth once daily.      aspirin 81 MG Chew Take 81 mg by mouth once.      diclofenac (VOLTAREN) 50 MG EC tablet Take 50 mg by mouth 2 (two) times daily.      gabapentin (NEURONTIN) 600 MG tablet Take 600 mg by mouth 3 (three) times daily.       Current Schedule Inpatient Medications:   apixaban  5 mg Oral BID    " apixaban  5 mg Oral ED 1 Time     Continuous Infusions:    Assessment:   PAF - AF RVR on arrival        No AF on EKGs or symptoms since 2021      [id number]: at least 3  HTN  Nonobstrucive CAD  HLD      Plan:   Admit for observation  Start Eliquis 5mg po BID  Cont atenolol   Echo today  [facility] to follow.         Thank you for your consult.     Hira Alaniz NP  Cardiology  [facility]

## 2025-01-06 NOTE — NURSING
Pt d/c home in stable condition. Education given on Eliquis and the importance of prevent injuries d/t high increase of bleeding. Voiced understanding. Informed pt that we will call him tomorrow morning with his appointments. Also, that he will have to get a heart monitor from [facility].

## 2025-01-06 NOTE — ASSESSMENT & PLAN NOTE
Patient's blood pressure range in the last 24 hours was: BP  Min: 100/61  Max: 192/154.The patient's inpatient anti-hypertensive regimen is listed below:  Current Antihypertensives  , 2 times daily, Oral  , Daily, Oral  atenoloL tablet 25 mg, 2 times daily, Oral  lisinopriL tablet 10 mg, Daily, Oral    Plan  - BP is controlled, no changes needed to their regimen  -  Bp more on higher side would likely tolerate increased atenolol if needed

## 2025-01-06 NOTE — ED PROVIDER NOTES
Encounter Date: 1/6/2025       History     Chief Complaint   Patient presents with    Palpitations     L arm pain, neck pain, palpation on and off all day. Started again around midnight. SBP at home was >200.     Mr Mehta is a 75-year-old man who is a patient of Dr. Jesus Dejesus with [facility].  He has history atrial fibrillation diagnose around 2021.  He also had an cardiac catheterization around that time that revealed nonobstructive coronary disease of 22-30% and an echocardiogram that revealed moderate aortic stenosis and mild tricuspid regurg.  He presents today with a complaint of left arm and neck pain for a large portion of the day associated with increased palpitations.  He did take a dose of atenolol in attempts to decrease the palpitations bolus unsuccessful.  He states that he has been increasingly fatigued during the day also and mildly short of breath.  His daughter is also in the room with him and states that his color is very poor and she is very anxious about his condition.      Review of patient's allergies indicates:  No Known Allergies  No past medical history on file.  No past surgical history on file.  No family history on file.     Review of Systems   Constitutional:  Positive for fatigue. Negative for chills and fever.   HENT: Negative.  Negative for congestion, sore throat and trouble swallowing.    Eyes:  Negative for discharge and visual disturbance.   Respiratory:  Positive for chest tightness and shortness of breath. Negative for cough and wheezing.    Cardiovascular:  Negative for chest pain and palpitations.   Gastrointestinal:  Negative for abdominal pain, diarrhea and vomiting.   Endocrine: Negative.    Genitourinary: Negative.  Negative for flank pain and hematuria.   Musculoskeletal:  Positive for neck pain. Negative for myalgias.   Skin: Negative.  Negative for rash.   Neurological: Negative.  Negative for dizziness, syncope and headaches.   Hematological: Negative.     Psychiatric/Behavioral: Negative.  Negative for hallucinations and suicidal ideas.    All other systems reviewed and are negative.      Physical Exam     Initial Vitals [01/06/25 0129]   BP Pulse Resp Temp SpO2   (!) 192/154 79 18 97.9 °F (36.6 °C) 99 %      MAP       --         Physical Exam    Constitutional: He appears well-developed and well-nourished. No distress.   HENT:   Head: Normocephalic and atraumatic.   Eyes: EOM are normal. Pupils are equal, round, and reactive to light.   Neck: Trachea normal. Neck supple.    Full passive range of motion without pain.     Cardiovascular:  Normal pulses. An irregularly irregular rhythm present.   Tachycardia present.         Murmur heard.  Pulmonary/Chest: Breath sounds normal.   Abdominal: Abdomen is soft. Bowel sounds are normal.   Musculoskeletal:      Cervical back: Full passive range of motion without pain and neck supple.      Comments: No deformity, Nl ROM     Lymphadenopathy:     He has no cervical adenopathy.   Neurological: He is alert and oriented to person, place, and time. He has normal strength. GCS eye subscore is 4. GCS verbal subscore is 5. GCS motor subscore is 6.   Skin: Skin is warm and intact. Capillary refill takes less than 2 seconds.   Psychiatric: He is not actively hallucinating. He expresses no homicidal and no suicidal ideation.         ED Course   Procedures  Labs Reviewed   COMPREHENSIVE METABOLIC PANEL - Abnormal       Result Value    Sodium 144      Potassium 3.5      Chloride 108 (*)     CO2 29      Glucose 117 (*)     Blood Urea Nitrogen 11.7      Creatinine 0.79      Calcium 9.6      Protein Total 8.1 (*)     Albumin 4.1      Globulin 4.0 (*)     Albumin/Globulin Ratio 1.0 (*)     Bilirubin Total 0.6            ALT 37      AST 33      eGFR >60      Anion Gap 7.0      BUN/Creatinine Ratio 15     CBC WITH DIFFERENTIAL - Abnormal    WBC 8.15      RBC 5.24      Hgb 16.5      Hct 50.8      MCV 96.9 (*)     MCH 31.5 (*)     MCHC  32.5 (*)     RDW 12.1      Platelet 119 (*)     MPV 11.0 (*)     Neut % 60.2      Lymph % 26.6      Mono % 7.4      Eos % 5.5      Basophil % 0.2      Lymph # 2.17      Neut # 4.90      Mono # 0.60      Eos # 0.45      Baso # 0.02      IG# 0.01      IG% 0.1     B-TYPE NATRIURETIC PEPTIDE - Abnormal    Natriuretic Peptide 131.0 (*)    TROPONIN I - Normal    Troponin-I <0.010     URINALYSIS, REFLEX TO URINE CULTURE - Normal    Color, UA Straw      Appearance, UA Clear      Specific Gravity, UA 1.010      pH, UA 7.0      Protein, UA Negative      Glucose, UA Negative      Ketones, UA Negative      Blood, UA Negative      Bilirubin, UA Negative      Urobilinogen, UA 0.2      Nitrites, UA Negative      Leukocyte Esterase, UA Negative     URINALYSIS, MICROSCOPIC - Normal    Bacteria, UA None Seen      RBC, UA None Seen      WBC, UA None Seen      Squamous Epithelial Cells, UA None Seen     CBC W/ AUTO DIFFERENTIAL    Narrative:     The following orders were created for panel order CBC auto differential.  Procedure                               Abnormality         Status                     ---------                               -----------         ------                     CBC with Differential[[id number]]       Abnormal            Final result                 Please view results for these tests on the individual orders.     EKG Readings: (Independently Interpreted)   Atrial fibrillation, rate 100     ECG Results              EKG 12-lead (In process)        Collection Time Result Time QRS Duration [facility] QTC Calculation    01/06/25 02:49:32 01/06/25 03:21:45 118 461                     In process by Interface, Lab In [facility] (01/06/25 03:21:49)                   Narrative:    Test Reason : R00.2,    Vent. Rate : 100 BPM     Atrial Rate : 241 BPM     P-R Int :    ms          QRS Dur : 118 ms      QT Int : 358 ms       P-R-T Axes :    -62  30 degrees    QTcB Int : 461 ms    Atrial fibrillation  Incomplete right bundle branch  block  Left anterior fascicular block  Abnormal ECG  When compared with ECG of 06-Jan-2025 01:22,  Previous ECG has undetermined rhythm, needs review    Referred By: AAAREFERRAL SELF           Confirmed By:                                      EKG 12-lead (In process)        Collection Time Result Time QRS Duration [facility] QTC Calculation    01/06/25 01:22:24 01/06/25 03:21:36 122 495                     In process by Interface, Lab In [facility] (01/06/25 03:21:38)                   Narrative:    Test Reason : R00.2,    Vent. Rate : 115 BPM     Atrial Rate : 122 BPM     P-R Int :    ms          QRS Dur : 122 ms      QT Int : 358 ms       P-R-T Axes :    -72  23 degrees    QTcB Int : 495 ms    Undetermined rhythm  Right bundle branch block  Left anterior fascicular block   Bifascicular block   Abnormal ECG  No previous ECGs available    Referred By: AAAREFERRAL SELF           Confirmed By:                                   Imaging Results              X-Ray Chest 1 View (In process)                      Medications   apixaban tablet 5 mg (has no administration in time range)   apixaban tablet 5 mg (5 mg Oral Given 1/6/25 0401)     Medical Decision Making  CBC was obtained to evaluate for anemia and for evidence of infection that could be seen with a white blood cell count elevation.    A CMP was ordered to evaluate the renal function for evidence of acute kidney injury based on an elevated creatinine, uremia based on an elevated BUN or suggestion of dehydration but elevated BUN/creatinine ratio.  We will also be able to screen for liver or biliary tract disease by assessing the AST/ALT levels.  Biliary obstruction can be assessed by reviewing the alkaline phosphatase and bilirubin levels.  Electrolyte abnormalities will also be ruled out with levels of the potassium, sodium and magnesium levels being evaluated.  A glucose reading will rule out hypo or hyperglycemia.  Will calculate the anion gap and assess for metabolic  acidosis.    A urinalysis will be done to rule out UTI.  Hematuria on the urinalysis may suggest a kidney stone or other pathology.  We will also assess for proteinuria on the urinalysis.    Cardiac enzymes including a CK and troponin will be obtained to rule out acute coronary syndrome or evidence of NSTEMI or other evidence of myocardial injury that could be present.    An EKG will be performed to rule out arrhythmias such as atrial fibrillation, SVT, V. tach profound bradycardia.  We will also review the EKG or evidence of ischemic change or ST elevation MI changes that would necessitate urgent interventional request    A chest x-ray will be performed to rule out pneumonia, pneumothorax, pneumomediastinum, pulmonary edema, cardiomegaly, pleural effusions, mass lesions or suggestions of a pericardial effusion.    Amount and/or Complexity of Data Reviewed  Labs: ordered.  Radiology: ordered.    Risk  Prescription drug management.               ED Course as of 01/06/25 0432   Mon Jan 06, 2025   4506 Patient has been seen by the cardiology consultation service.  They have recommended admission to our facility.  We have given a dose of Eliquis and we will continue therapy on the floor.  Echo will be done in the morning.  I have discussed with the patient the plan. [DB]   1324 I have spoken with the patient and available family/caregivers at the bedside.  I have explained the patient's condition, diagnosis, and treatment plan based on the information available to me at this time.  I have answered questions and addressed any concerns that we area able to do so adequately after this initial ER assessment.  The patient and available family members understand the patient's admission diagnosis, condition, and treatment plan well.  The patient has been stabilized within the capability of the emergency department.      I feel that admission is indicated for the patient.  The patient will be moved to an observation or  inpatient service.   I have communicated with the inpatient medical practitioner taking over this patient's care and discussed the case.  I will write admission orders for admission.     [DB]      ED Course User Index  [DB] Raciel Ramirez MD                           Clinical Impression:  Final diagnoses:  [R00.2] Palpitations  [I48.91] Atrial fibrillation, unspecified type (Primary)  [R06.00] Dyspnea, unspecified type          ED Disposition Condition    Observation Stable                Raciel Ramriez MD  01/06/25 0432

## 2025-01-06 NOTE — ASSESSMENT & PLAN NOTE
Patient has paroxysmal (<7 days) atrial fibrillation. Patient is currently in atrial fibrillation. [id number] Score: 2. The patients heart rate in the last 24 hours is as follows:  Pulse  Min: 55  Max: 113     Antiarrhythmics  , 2 times daily, Oral    Anticoagulants  apixaban tablet 5 mg, 2 times daily, Oral    Plan  - Replete lytes with a goal of K>4, Mg >2  - Patient is anticoagulated, see medications listed above.  - Patient's afib is currently controlled  -  troponin and BNP not elevated  Recheck troponin now  Needs repeat echo if unable to have done today may see if could be done at [facility]  Also needs outpt Schedule 14 day MCT with [facility] Bridget Ontiveros  Follow-up with [facility] Bridget Ontiveros, Dr Dejesus for results of MCT and hospital follow-up

## 2025-01-06 NOTE — ASSESSMENT & PLAN NOTE
The likely etiology of thrombocytopenia is  unknown etiology will need to f/u with PCP and cardiology laila since started doAC . The patients 3 most recent labs are listed below.  Recent Labs     01/06/25  0152   *     Plan  - Will transfuse if platelet count is <50k (if undergoing surgical procedure or have active bleeding).  -

## 2025-01-07 NOTE — PLAN OF CARE
[facility]  Discharge Final Note    Primary Care Provider: Bk Germain Jr., MD    Expected Discharge Date: 1/6/2025    Final Discharge Note (most recent)       Final Note - 01/07/25 1416          Final Note    Assessment Type Final Discharge Note     Anticipated Discharge Disposition Home or Self Care     What phone number can be called within the next 1-3 days to see how you are doing after discharge? [phone]     Hospital Resources/Appts/Education Provided Provided patient/caregiver with written discharge plan information        Post-Acute Status    Discharge Delays None known at this time                     Important Message from Medicare             Contact Info       Bk Germain Jr., MD   Specialty: Family Medicine   Relationship: PCP - General    [address] [place] [address] A  [facility] [address]   Phone: [phone]       Next Steps: Follow up    Instructions: We will call you with your appointment.    Stephanie Dejesus MD   Specialty: Cardiovascular Disease, Cardiology    [address]  [facility] [place]  [facility] [address]   Phone: [phone]       Next Steps: Follow up    Instructions: Schedule pt for MCT monitor.  We will call you with your appointment.

## 2025-01-07 NOTE — DISCHARGE SUMMARY
[facility]  [facility] Medicine  Discharge Summary      Patient Name: Toni Mehta  MRN: [id number]  SAFIA: [id number]  Patient Class: OP- Observation  Admission Date: 1/6/2025  Hospital Length of Stay: 0 days  Discharge Date and Time: 1/6/2025  6:00 PM  Attending Physician: No att. providers found   Discharging Provider: Lisa Payne MD  Primary Care Provider: Bk Germain Jr., MD    Primary Care Team: Networked reference to record PCT     HPI:     75-year-old male, known to Dr. Dejesus, PCP Dr. Bhatti with PMH of  PAF, nonobstructive CAD, HTN, HLD, who presented to the ER with palpitations for the last 2-3 days. He reported taking an extra dose of atenolol without improvement. He denied CP, SOB, N/V, or diaphoresis. EKG on arrival revealed AF RVR which eventually improved with CVR without intervention (he did take the extra dose of atenolol just prior to coming to ER).  Initial trop was negative. At time of assessment he remained in AF controlled rate and denied complaints. PT reports he had some left elbow and left jaw pain during episode of fast HR yesterday, denies any ongoing or prescence of chest pain. No nausea or vomiting, currently is chest pain free. Pt adamant he does not want to stay in the hospital another night.  Says he feels better and wants to go home.    * No surgery found *      Hospital Course:   01/06/2025 DISCHARGE SUMMARY: pt admitted with afib with RVR h/o PAF followed by Dr. Dejesus in the past, he was admitted, HR was already down with extra dose of atenolol.  [facility] recommended echo, addition of eliquis 5mg bid.  PT anxious to go home and does not want to stay for results.  He will be set up with [facility] office for monitoring to be placed in am and echo was done initial verbal report looks good with no significant decrease in EF since prior echo back in 2023.  He was d/c home and will keep f/u with PCP and with DR. Dejesus at [facility] [place].   Pt did have a  "slight bump in troponin, but did not want to stay, was chest pain free and discussed with pt and his daughter he will keep f/u and return for any chest pain    Goals of Care Treatment Preferences:  Code Status: Full Code      SDOH Screening:  The patient was screened for utility difficulties, food insecurity, transport difficulties, housing insecurity, and interpersonal safety and there were no concerns identified this admission.     Consults:   Consults (From admission, onward)          Status Ordering Provider     Inpatient consult to Cardiology  Once        Provider:  Rusty Yanes DERRICK            * Atrial fibrillation with rapid ventricular response  Patient has paroxysmal (<7 days) atrial fibrillation. Patient is currently in atrial fibrillation. [id number] Score: 2. The patients heart rate in the last 24 hours is as follows:  Pulse  Min: 55  Max: 113     Antiarrhythmics  , 2 times daily, Oral    Anticoagulants  apixaban tablet 5 mg, 2 times daily, Oral    Plan  - Replete lytes with a goal of K>4, Mg >2  - Patient is anticoagulated, see medications listed above.  - Patient's afib is currently controlled  -  troponin and BNP not elevated  Recheck troponin now  Needs repeat echo if unable to have done today may see if could be done at [facility]  Also needs outpt Schedule 14 day MCT with [facility] [place]  Follow-up with [facility], Dr Dejesus for results of MCT and hospital follow-up        Mixed hyperlipidemia   Patient is chronically on statin.will continue for now. Monitor clinically. Last LDL was No results found for: "LDLCALC"         Primary hypertension  Patient's blood pressure range in the last 24 hours was: BP  Min: 100/61  Max: 192/154.The patient's inpatient anti-hypertensive regimen is listed below:  Current Antihypertensives  , 2 times daily, Oral  , Daily, Oral  atenoloL tablet 25 mg, 2 times daily, Oral  lisinopriL tablet 10 mg, Daily, Oral    Plan  - BP is controlled, no " changes needed to their regimen  -  Bp more on higher side would likely tolerate increased atenolol if needed    Thrombocytopenia  The likely etiology of thrombocytopenia is  unknown etiology will need to f/u with PCP and cardiology laila since started doAC . The patients 3 most recent labs are listed below.  Recent Labs     01/06/25  0152   *     Plan  - Will transfuse if platelet count is <50k (if undergoing surgical procedure or have active bleeding).  -          Final Active Diagnoses:    Diagnosis Date Noted POA    PRINCIPAL PROBLEM:  Atrial fibrillation with rapid ventricular response [I48.91] 01/06/2025 Yes    Thrombocytopenia [D69.6] 01/06/2025 Yes    Primary hypertension [I10] 01/06/2025 Yes    Mixed hyperlipidemia [E78.2] 01/06/2025 Yes      Problems Resolved During this Admission:       Discharged Condition: good    Disposition: Home or Self Care    Follow Up:   Follow-up Information       Bk Germain Jr., MD Follow up.    Specialty: Family Medicine  Why: We will call you with your appointment.  Contact information:  [address] [place] [address]  [address]  [facility] [address]  [phone]               Stephanie Dejesus MD Follow up.    Specialties: Cardiovascular Disease, Cardiology  Why: Schedule pt for MCT monitor.  We will call you with your appointment.  Contact information:  [facility] [facility] [address]  [phone]                           Patient Instructions:   No discharge procedures on file.    Significant Diagnostic Studies: Labs: CMP   Recent Labs   Lab 01/06/25  0152      K 3.5   *   CO2 29   BUN 11.7   CREATININE 0.79   CALCIUM 9.6   ALBUMIN 4.1   BILITOT 0.6   ALKPHOS 123   AST 33   ALT 37    and CBC   Recent Labs   Lab 01/06/25  0152   WBC 8.15   HGB 16.5   HCT 50.8   *       Pending Diagnostic Studies:       None           Medications:  Reconciled Home Medications:      Medication List        ASK your doctor about these  medications      aspirin 81 MG Chew  Take 81 mg by mouth once.     atenoloL 25 MG tablet  Commonly known as: TENORMIN  Take 25 mg by mouth 2 (two) times daily.     atorvastatin 40 MG tablet  Commonly known as: LIPITOR  Take 40 mg by mouth once daily.     diclofenac 50 MG EC tablet  Commonly known as: VOLTAREN  Take 50 mg by mouth 2 (two) times daily.     gabapentin 600 MG tablet  Commonly known as: NEURONTIN  Take 600 mg by mouth 3 (three) times daily.     lisinopriL 10 MG tablet  Take 10 mg by mouth once daily.     tamsulosin 0.4 mg Cap  Commonly known as: FLOMAX  Take 0.4 mg by mouth once daily.              Indwelling Lines/Drains at time of discharge:   Lines/Drains/Airways       None                   Time spent on the discharge of patient: 36 minutes         Lisa Payne MD  Department of Hospital Medicine  [facility]

## 2025-01-07 NOTE — HOSPITAL COURSE
01/06/2025 DISCHARGE SUMMARY: pt admitted with afib with RVR h/o PAF followed by Dr. Dejesus in the past, he was admitted, HR was already down with extra dose of atenolol.  [facility] recommended echo, addition of eliquis 5mg bid.  PT anxious to go home and does not want to stay for results.  He will be set up with [facility] office for monitoring to be placed in am and echo was done initial verbal report looks good with no significant decrease in EF since prior echo back in 2023.  He was d/c home and will keep f/u with PCP and with DR. Dejesus at [facility].   Date Of Previous Biopsy (Optional): 2-8-23

## 2025-01-08 ENCOUNTER — PATIENT OUTREACH (OUTPATIENT)
Dept: ADMINISTRATIVE | Facility: CLINIC | Age: 80
End: 2025-01-08
Payer: MEDICARE

## 2025-01-08 NOTE — PROGRESS NOTES
C3 nurse attempted to contact Toni Mehta for a TCC post hospital discharge follow up call. No answer. Left voicemail with callback information. The patient does not have a scheduled HOSFU appointment. Message sent to PCP staff for assistance with scheduling visit with patient.

## 2025-01-09 NOTE — PROGRESS NOTES
2nd attempt-C3 nurse attempted to contact Toni Mehta for a TCC post hospital discharge follow up call. No answer. Left voicemail with callback information. The patient does not have a scheduled HOSFU appointment. Message sent to PCP staff for assistance with scheduling visit with patient.

## 2025-01-09 NOTE — PROGRESS NOTES
3rd attempt-C3 nurse attempted to contact Toni Mehta for a TCC post hospital discharge follow up call. No answer. Left voicemail with callback information. The patient does not have a scheduled HOSFU appointment. Message sent to PCP staff for assistance with scheduling visit with patient.

## 2025-03-06 ENCOUNTER — LAB VISIT (OUTPATIENT)
Dept: LAB | Facility: HOSPITAL | Age: 80
End: 2025-03-06
Attending: INTERNAL MEDICINE
Payer: MEDICARE

## 2025-03-06 DIAGNOSIS — I10 ESSENTIAL HYPERTENSION, MALIGNANT: Primary | ICD-10-CM

## 2025-03-06 LAB
ALBUMIN SERPL-MCNC: 3.7 G/DL (ref 3.4–4.8)
ALBUMIN/GLOB SERPL: 1 RATIO (ref 1.1–2)
ALP SERPL-CCNC: 135 UNIT/L (ref 40–150)
ALT SERPL-CCNC: 20 UNIT/L (ref 0–55)
ANION GAP SERPL CALC-SCNC: 7 MEQ/L
AST SERPL-CCNC: 19 UNIT/L (ref 5–34)
BILIRUB SERPL-MCNC: 0.9 MG/DL
BUN SERPL-MCNC: 9.4 MG/DL (ref 8.4–25.7)
CALCIUM SERPL-MCNC: 9.4 MG/DL (ref 8.8–10)
CHLORIDE SERPL-SCNC: 105 MMOL/L (ref 98–107)
CHOLEST SERPL-MCNC: 92 MG/DL
CHOLEST/HDLC SERPL: 3 {RATIO} (ref 0–5)
CO2 SERPL-SCNC: 28 MMOL/L (ref 23–31)
CREAT SERPL-MCNC: 0.77 MG/DL (ref 0.72–1.25)
CREAT/UREA NIT SERPL: 12
ERYTHROCYTE [DISTWIDTH] IN BLOOD BY AUTOMATED COUNT: 12.5 % (ref 11.5–17)
GFR SERPLBLD CREATININE-BSD FMLA CKD-EPI: >60 ML/MIN/1.73/M2
GLOBULIN SER-MCNC: 3.7 GM/DL (ref 2.4–3.5)
GLUCOSE SERPL-MCNC: 106 MG/DL (ref 82–115)
HCT VFR BLD AUTO: 46.1 % (ref 42–52)
HDLC SERPL-MCNC: 28 MG/DL (ref 35–60)
HGB BLD-MCNC: 15.5 G/DL (ref 14–18)
LDLC SERPL CALC-MCNC: 48 MG/DL (ref 50–140)
MCH RBC QN AUTO: 32.4 PG (ref 27–31)
MCHC RBC AUTO-ENTMCNC: 33.6 G/DL (ref 33–36)
MCV RBC AUTO: 96.2 FL (ref 80–94)
PLATELET # BLD AUTO: 153 X10(3)/MCL (ref 130–400)
PMV BLD AUTO: 10.7 FL (ref 7.4–10.4)
POTASSIUM SERPL-SCNC: 4.4 MMOL/L (ref 3.5–5.1)
PROT SERPL-MCNC: 7.4 GM/DL (ref 5.8–7.6)
RBC # BLD AUTO: 4.79 X10(6)/MCL (ref 4.7–6.1)
SODIUM SERPL-SCNC: 140 MMOL/L (ref 136–145)
TRIGL SERPL-MCNC: 79 MG/DL (ref 34–140)
VLDLC SERPL CALC-MCNC: 16 MG/DL
WBC # BLD AUTO: 7.78 X10(3)/MCL (ref 4.5–11.5)

## 2025-03-06 PROCEDURE — 80061 LIPID PANEL: CPT

## 2025-03-06 PROCEDURE — 36415 COLL VENOUS BLD VENIPUNCTURE: CPT

## 2025-03-06 PROCEDURE — 80053 COMPREHEN METABOLIC PANEL: CPT

## 2025-03-06 PROCEDURE — 85027 COMPLETE CBC AUTOMATED: CPT

## 2025-04-08 ENCOUNTER — HOSPITAL ENCOUNTER (EMERGENCY)
Facility: HOSPITAL | Age: 80
Discharge: HOME | End: 2025-04-08
Attending: SPECIALIST
Payer: MEDICARE

## 2025-04-08 VITALS
TEMPERATURE: 98 F | HEART RATE: 56 BPM | WEIGHT: 190 LBS | SYSTOLIC BLOOD PRESSURE: 166 MMHG | HEIGHT: 67 IN | DIASTOLIC BLOOD PRESSURE: 71 MMHG | RESPIRATION RATE: 16 BRPM | BODY MASS INDEX: 29.82 KG/M2 | OXYGEN SATURATION: 99 %

## 2025-04-08 DIAGNOSIS — M79.606 LEG PAIN: ICD-10-CM

## 2025-04-08 DIAGNOSIS — S70.11XA CONTUSION OF RIGHT THIGH, INITIAL ENCOUNTER: Primary | ICD-10-CM

## 2025-04-08 PROCEDURE — 99283 EMERGENCY DEPT VISIT LOW MDM: CPT | Mod: 25

## 2025-04-08 RX ORDER — TRAMADOL HYDROCHLORIDE 50 MG/1
50 TABLET ORAL EVERY 6 HOURS PRN
Qty: 20 TABLET | Refills: 0 | Status: SHIPPED | OUTPATIENT
Start: 2025-04-08
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
## 2025-04-09 NOTE — DISCHARGE INSTRUCTIONS
Place a warm compress for 15 minutes 3 times a day;  You may follow this with ice for a few minutes if you notice increased swelling

## 2025-04-12 ENCOUNTER — HOSPITAL ENCOUNTER (EMERGENCY)
Facility: HOSPITAL | Age: 80
Discharge: HOME | End: 2025-04-12
Attending: EMERGENCY MEDICINE
Payer: MEDICARE

## 2025-04-12 VITALS
BODY MASS INDEX: 29.76 KG/M2 | RESPIRATION RATE: 20 BRPM | SYSTOLIC BLOOD PRESSURE: 162 MMHG | WEIGHT: 190 LBS | TEMPERATURE: 98 F | HEART RATE: 62 BPM | DIASTOLIC BLOOD PRESSURE: 72 MMHG | OXYGEN SATURATION: 100 %

## 2025-04-12 DIAGNOSIS — S70.11XD THIGH HEMATOMA, RIGHT, SUBSEQUENT ENCOUNTER: Primary | ICD-10-CM

## 2025-04-12 DIAGNOSIS — M79.89 LEG SWELLING: ICD-10-CM

## 2025-04-12 PROCEDURE — 99282 EMERGENCY DEPT VISIT SF MDM: CPT

## 2025-04-12 NOTE — ED NOTES
Spoke w/ ZHEN PERERA regarding drawing labs for pt, but ZHEN PERERA okay w/ discharging pt @ this time.
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
## 2025-04-16 ENCOUNTER — HOSPITAL ENCOUNTER (EMERGENCY)
Facility: HOSPITAL | Age: 80
Discharge: HOME | End: 2025-04-16
Attending: EMERGENCY MEDICINE
Payer: MEDICARE

## 2025-04-16 VITALS
HEIGHT: 67 IN | OXYGEN SATURATION: 99 % | WEIGHT: 190 LBS | RESPIRATION RATE: 19 BRPM | HEART RATE: 67 BPM | SYSTOLIC BLOOD PRESSURE: 173 MMHG | BODY MASS INDEX: 29.82 KG/M2 | TEMPERATURE: 99 F | DIASTOLIC BLOOD PRESSURE: 50 MMHG

## 2025-04-16 DIAGNOSIS — L03.115 CELLULITIS OF RIGHT LOWER EXTREMITY: ICD-10-CM

## 2025-04-16 DIAGNOSIS — M79.89 RIGHT LEG SWELLING: ICD-10-CM

## 2025-04-16 DIAGNOSIS — S70.11XD HEMATOMA OF RIGHT THIGH, SUBSEQUENT ENCOUNTER: Primary | ICD-10-CM

## 2025-04-16 DIAGNOSIS — M79.604 RIGHT LEG PAIN: ICD-10-CM

## 2025-04-16 LAB
ALBUMIN SERPL-MCNC: 3.3 G/DL (ref 3.4–4.8)
ALBUMIN/GLOB SERPL: 1 RATIO (ref 1.1–2)
ALP SERPL-CCNC: 162 UNIT/L (ref 40–150)
ALT SERPL-CCNC: 17 UNIT/L (ref 0–55)
ANION GAP SERPL CALC-SCNC: 6 MEQ/L
AST SERPL-CCNC: 19 UNIT/L (ref 11–45)
BASOPHILS # BLD AUTO: 0.03 X10(3)/MCL
BASOPHILS NFR BLD AUTO: 0.3 %
BILIRUB SERPL-MCNC: 1.7 MG/DL
BUN SERPL-MCNC: 13.8 MG/DL (ref 8.4–25.7)
CALCIUM SERPL-MCNC: 8.5 MG/DL (ref 8.8–10)
CHLORIDE SERPL-SCNC: 107 MMOL/L (ref 98–107)
CO2 SERPL-SCNC: 26 MMOL/L (ref 23–31)
CREAT SERPL-MCNC: 0.82 MG/DL (ref 0.72–1.25)
CREAT/UREA NIT SERPL: 17
EOSINOPHIL # BLD AUTO: 0.35 X10(3)/MCL (ref 0–0.9)
EOSINOPHIL NFR BLD AUTO: 3.6 %
ERYTHROCYTE [DISTWIDTH] IN BLOOD BY AUTOMATED COUNT: 13.6 % (ref 11.5–17)
GFR SERPLBLD CREATININE-BSD FMLA CKD-EPI: >60 ML/MIN/1.73/M2
GLOBULIN SER-MCNC: 3.4 GM/DL (ref 2.4–3.5)
GLUCOSE SERPL-MCNC: 134 MG/DL (ref 82–115)
HCT VFR BLD AUTO: 30.6 % (ref 42–52)
HGB BLD-MCNC: 10 G/DL (ref 14–18)
IMM GRANULOCYTES # BLD AUTO: 0.05 X10(3)/MCL (ref 0–0.04)
IMM GRANULOCYTES NFR BLD AUTO: 0.5 %
LYMPHOCYTES # BLD AUTO: 1.59 X10(3)/MCL (ref 0.6–4.6)
LYMPHOCYTES NFR BLD AUTO: 16.5 %
MCH RBC QN AUTO: 32.2 PG (ref 27–31)
MCHC RBC AUTO-ENTMCNC: 32.7 G/DL (ref 33–36)
MCV RBC AUTO: 98.4 FL (ref 80–94)
MONOCYTES # BLD AUTO: 0.77 X10(3)/MCL (ref 0.1–1.3)
MONOCYTES NFR BLD AUTO: 8 %
NEUTROPHILS # BLD AUTO: 6.82 X10(3)/MCL (ref 2.1–9.2)
NEUTROPHILS NFR BLD AUTO: 71.1 %
NRBC BLD AUTO-RTO: 0 %
PLATELET # BLD AUTO: 155 X10(3)/MCL (ref 130–400)
PMV BLD AUTO: 10.9 FL (ref 7.4–10.4)
POTASSIUM SERPL-SCNC: 4 MMOL/L (ref 3.5–5.1)
PROT SERPL-MCNC: 6.7 GM/DL (ref 5.8–7.6)
RBC # BLD AUTO: 3.11 X10(6)/MCL (ref 4.7–6.1)
SODIUM SERPL-SCNC: 139 MMOL/L (ref 136–145)
WBC # BLD AUTO: 9.61 X10(3)/MCL (ref 4.5–11.5)

## 2025-04-16 PROCEDURE — 99284 EMERGENCY DEPT VISIT MOD MDM: CPT | Mod: 25

## 2025-04-16 PROCEDURE — 80053 COMPREHEN METABOLIC PANEL: CPT | Performed by: PHYSICIAN ASSISTANT

## 2025-04-16 PROCEDURE — 85025 COMPLETE CBC W/AUTO DIFF WBC: CPT | Performed by: PHYSICIAN ASSISTANT

## 2025-04-16 RX ORDER — SULFAMETHOXAZOLE AND TRIMETHOPRIM 800; 160 MG/1; MG/1
1 TABLET ORAL 2 TIMES DAILY
Qty: 20 TABLET | Refills: 0 | Status: SHIPPED | OUTPATIENT
Start: 2025-04-16 | End: 2025-04-26
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
## 2025-04-17 NOTE — ED PROVIDER NOTES
Encounter Date: 4/16/2025       History     Chief Complaint   Patient presents with    Leg Pain     Pt co right leg swollen and painful and discolored for over last 3 weeks. Pt takes blood thinners eliquis     See MDM    The history is provided by the patient. No  was used.     Review of patient's allergies indicates:  No Known Allergies  Past Medical History:   Diagnosis Date    Hypertension      History reviewed. No pertinent surgical history.  No family history on file.  Social History[1]  Review of Systems   Constitutional:  Negative for fever.   Respiratory:  Negative for cough and shortness of breath.    Cardiovascular:  Negative for chest pain.   Gastrointestinal:  Negative for abdominal pain.   Genitourinary:  Negative for difficulty urinating and dysuria.   Musculoskeletal:  Negative for gait problem.   Skin:  Negative for color change.   Neurological:  Negative for dizziness, speech difficulty and headaches.   Psychiatric/Behavioral:  Negative for hallucinations and suicidal ideas.    All other systems reviewed and are negative.      Physical Exam     Initial Vitals [04/16/25 1840]   BP Pulse Resp Temp SpO2   (!) 177/65 73 20 98.2 °F (36.8 °C) 99 %      MAP       --         Physical Exam    Nursing note and vitals reviewed.  Constitutional: He appears well-developed and well-nourished.   HENT:   Head: Normocephalic.   Eyes: EOM are normal.   Neck: Neck supple.   Normal range of motion.  Cardiovascular:  Normal rate, regular rhythm, normal heart sounds and intact distal pulses.           Pulmonary/Chest: Breath sounds normal.   Abdominal: Abdomen is soft. Bowel sounds are normal.   Musculoskeletal:         General: Normal range of motion.      Cervical back: Normal range of motion and neck supple.     Neurological: He is alert and oriented to person, place, and time. He has normal strength.   Skin: Skin is warm and dry. Capillary refill takes less than 2 seconds.   Patient has a hematoma  to his right thigh.  This is soft and appears to be improving according the patient.  He also has some redness to the lower leg that appears to be warm and cellulitic   Psychiatric: He has a normal mood and affect. His behavior is normal. Judgment and thought content normal.         ED Course   Procedures  Labs Reviewed   COMPREHENSIVE METABOLIC PANEL - Abnormal       Result Value    Sodium 139      Potassium 4.0      Chloride 107      CO2 26      Glucose 134 (*)     Blood Urea Nitrogen 13.8      Creatinine 0.82      Calcium 8.5 (*)     Protein Total 6.7      Albumin 3.3 (*)     Globulin 3.4      Albumin/Globulin Ratio 1.0 (*)     Bilirubin Total 1.7 (*)      (*)     ALT 17      AST 19      eGFR >60      Anion Gap 6.0      BUN/Creatinine Ratio 17     CBC WITH DIFFERENTIAL - Abnormal    WBC 9.61      RBC 3.11 (*)     Hgb 10.0 (*)     Hct 30.6 (*)     MCV 98.4 (*)     MCH 32.2 (*)     MCHC 32.7 (*)     RDW 13.6      Platelet 155      MPV 10.9 (*)     Neut % 71.1      Lymph % 16.5      Mono % 8.0      Eos % 3.6      Basophil % 0.3      Imm Grans % 0.5      Neut # 6.82      Lymph # 1.59      Mono # 0.77      Eos # 0.35      Baso # 0.03      Imm Gran # 0.05 (*)     NRBC% 0.0     CBC W/ AUTO DIFFERENTIAL    Narrative:     The following orders were created for panel order CBC auto differential.  Procedure                               Abnormality         Status                     ---------                               -----------         ------                     CBC with Differential[[id number]]       Abnormal            Final result                 Please view results for these tests on the individual orders.          Imaging Results              X-Ray Tibia Fibula 2 View Right (Final result)  Result time 04/16/25 19:13:05      Final result by Jose Angel Barclay MD (04/16/25 19:13:05)                   Impression:      As above.      Electronically signed by: Jose Angel Barclay  Date:    04/16/2025  Time:    19:13                Narrative:    EXAMINATION:  XR TIBIA FIBULA 2 VIEW RIGHT    CLINICAL HISTORY:  Pain in right leg    TECHNIQUE:  Two views of the right tibia and fibula.    COMPARISON:  No prior imaging available for comparison    FINDINGS:  Soft tissue edema with no underlying osseous abnormality.  Correlate physical exam.  Findings may be seen with cellulitis.                                       Medications - No data to display  Medical Decision Making  Historian:  Patient.  Patient is a White 79 y.o. male that presents with hematoma to right thigh that has been present 2-3 weeks. Associated symptoms also has some swelling and redness to the lower right leg last few days. Surrounding information is nothing. Exacerbated by nothing. Relieved by nothing. Patient treatment prior to arrival none. Risk factors include blood thinner. Other history pertaining to this complaint nothing.   Assessment:  See physical exam.  DD:  Hematoma, cellulitis, DVT  ED Course: History was obtained.  Physical was performed.  Patient appears to not have a DVT.  He has a hematoma to the right thigh.  He also has cellulitis to the lower right extremity. Medical or surgical consults:  None. Social determinants that affect healthcare:  None.       Amount and/or Complexity of Data Reviewed  External Data Reviewed: notes.     Details: Review with both outpatient ER visits from the last 2 weeks  Labs:      Details: Labs unremarkable except for mild elevation of alk-phos and bilirubin  Radiology:      Details: Ultrasound shows no DVT    Risk  Risk Details: Bactrim                                      Clinical Impression:  Final diagnoses:  [M79.604] Right leg pain  [M79.89] Right leg swelling  [S70.11XD] Hematoma of right thigh, subsequent encounter (Primary)  [L03.115] Cellulitis of right lower extremity          ED Disposition Condition    Discharge Stable          ED Prescriptions       Medication Sig Dispense Start Date End Date Auth. Provider     sulfamethoxazole-trimethoprim 800-160mg (BACTRIM DS) 800-160 mg Tab Take 1 tablet by mouth 2 (two) times daily. for 10 days 20 tablet 4/16/2025 4/26/2025 Davon Garsia FNP          Follow-up Information       Follow up With Specialties Details Why Contact Info    Bk Germain Jr., MD Family Medicine Call in 3 days For suture removal [address] [place] [address] A  [facility] [address]  [phone]                 [1]   Social History  Tobacco Use    Smoking status: Never    Smokeless tobacco: Never   Substance Use Topics    Alcohol use: Not Currently    Drug use: Not Currently        Davon Garsia FNP  04/16/25 2792

## 2025-04-24 ENCOUNTER — HOSPITAL ENCOUNTER (OUTPATIENT)
Dept: RADIOLOGY | Facility: HOSPITAL | Age: 80
Discharge: HOME | End: 2025-04-24
Attending: FAMILY MEDICINE
Payer: MEDICARE

## 2025-04-24 DIAGNOSIS — S70.11XA HEMATOMA OF RIGHT THIGH, INITIAL ENCOUNTER: ICD-10-CM

## 2025-04-24 DIAGNOSIS — M79.89 RIGHT LEG SWELLING: ICD-10-CM

## 2025-04-24 PROCEDURE — 73700 CT LOWER EXTREMITY W/O DYE: CPT | Mod: TC,RT

## 2025-05-15 ENCOUNTER — HOSPITAL ENCOUNTER (OUTPATIENT)
Dept: RADIOLOGY | Facility: HOSPITAL | Age: 80
Discharge: HOME | End: 2025-05-15
Attending: FAMILY MEDICINE
Payer: MEDICARE

## 2025-05-15 DIAGNOSIS — M79.89 RIGHT LEG SWELLING: ICD-10-CM

## 2025-05-15 PROCEDURE — 76881 US COMPL JOINT R-T W/IMG: CPT | Mod: TC,RT

## 2025-09-04 ENCOUNTER — HOSPITAL ENCOUNTER (OUTPATIENT)
Dept: RADIOLOGY | Facility: HOSPITAL | Age: 80
Discharge: HOME | End: 2025-09-04
Attending: FAMILY MEDICINE
Payer: MEDICARE

## 2025-09-04 DIAGNOSIS — R74.8 ELEVATED LIVER ENZYMES: ICD-10-CM

## 2025-09-04 DIAGNOSIS — R10.9 ABDOMINAL PAIN, UNSPECIFIED ABDOMINAL LOCATION: ICD-10-CM

## 2025-09-04 PROCEDURE — 74178 CT ABD&PLV WO CNTR FLWD CNTR: CPT | Mod: TC

## 2025-09-04 PROCEDURE — 25500020 PHARM REV CODE 255: Performed by: FAMILY MEDICINE

## 2025-09-04 RX ADMIN — IOHEXOL 100 ML: 350 INJECTION, SOLUTION INTRAVENOUS at 11:09
